# Patient Record
Sex: FEMALE | Race: WHITE | Employment: FULL TIME | URBAN - METROPOLITAN AREA
[De-identification: names, ages, dates, MRNs, and addresses within clinical notes are randomized per-mention and may not be internally consistent; named-entity substitution may affect disease eponyms.]

---

## 2017-08-08 PROBLEM — B96.89 ACUTE BACTERIAL PHARYNGITIS: Status: ACTIVE | Noted: 2017-08-08

## 2017-08-08 PROBLEM — M54.50 LOW BACK PAIN: Status: ACTIVE | Noted: 2017-08-08

## 2017-08-08 PROBLEM — Z78.0 MENOPAUSE: Status: ACTIVE | Noted: 2017-08-08

## 2017-08-08 PROBLEM — J02.8 ACUTE BACTERIAL PHARYNGITIS: Status: ACTIVE | Noted: 2017-08-08

## 2017-08-08 PROBLEM — L03.90 CELLULITIS: Status: ACTIVE | Noted: 2017-08-08

## 2017-08-08 PROBLEM — Z20.828 EXPOSURE TO INFLUENZA: Status: ACTIVE | Noted: 2017-08-08

## 2017-08-08 PROBLEM — E78.00 HYPERCHOLESTEROLEMIA: Status: ACTIVE | Noted: 2017-08-08

## 2017-08-09 PROBLEM — M26.609 TMJ DYSFUNCTION: Status: ACTIVE | Noted: 2017-08-09

## 2017-09-12 ENCOUNTER — OFFICE VISIT (OUTPATIENT)
Dept: INTERNAL MEDICINE CLINIC | Age: 52
End: 2017-09-12

## 2017-09-12 VITALS
BODY MASS INDEX: 28.85 KG/M2 | DIASTOLIC BLOOD PRESSURE: 70 MMHG | SYSTOLIC BLOOD PRESSURE: 120 MMHG | HEIGHT: 64 IN | WEIGHT: 169 LBS | HEART RATE: 70 BPM

## 2017-09-12 DIAGNOSIS — E78.00 HYPERCHOLESTEROLEMIA: ICD-10-CM

## 2017-09-12 DIAGNOSIS — Z00.00 ROUTINE PHYSICAL EXAMINATION: Primary | ICD-10-CM

## 2017-09-12 NOTE — PATIENT INSTRUCTIONS

## 2017-09-12 NOTE — MR AVS SNAPSHOT
Visit Information Date & Time Provider Department Dept. Phone Encounter #  
 9/12/2017  1:30 PM Bobbi Cervantes MD [address] [place] [address] [facility] [phone] [id number] Follow-up Instructions Return for as needed. Upcoming Health Maintenance Date Due Hepatitis C Screening 1965 DTaP/Tdap/Td series (1 - Tdap) 3/15/1984 PAP AKA CERVICAL CYTOLOGY 3/15/1984 BREAST CANCER SCRN MAMMOGRAM 3/15/2013 FOBT Q 1 YEAR AGE 50-75 3/15/2013 INFLUENZA AGE 9 TO ADULT 8/1/2017 Allergies as of 9/12/2017  Review Complete On: 9/12/2017 By: Bobbi Cervantes MD  
 No Known Allergies Current Immunizations  Never Reviewed No immunizations on file. Not reviewed this visit You Were Diagnosed With   
  
 Codes Comments Routine physical examination    -  Primary ICD-10-CM: Z00.00 ICD-9-CM: V70.0 Hypercholesterolemia     ICD-10-CM: E78.00 ICD-9-CM: 272.0 Vitals BP Pulse Height(growth percentile) Weight(growth percentile) BMI Smoking Status 120/70 (BP 1 Location: Right arm, BP Patient Position: Sitting) 70 5' 4\" (1.626 m) 169 lb (76.7 kg) 29.01 kg/m2 Never Smoker BMI and BSA Data Body Mass Index Body Surface Area  
 29.01 kg/m 2 1.86 m 2 Your Updated Medication List  
  
Notice  As of 9/12/2017  2:04 PM  
 You have not been prescribed any medications. We Performed the Following CBC WITH AUTOMATED DIFF [36842 CPT(R)] HEMOGLOBIN A1C WITH EAG [52562 CPT(R)] LIPID PANEL [46986 CPT(R)] METABOLIC PANEL, COMPREHENSIVE [08587 CPT(R)] TSH 3RD GENERATION [24743 CPT(R)] URINALYSIS W/ RFLX MICROSCOPIC [83105 CPT(R)] Follow-up Instructions Return for as needed. Patient Instructions High Cholesterol: Care Instructions Your Care Instructions Cholesterol is a type of fat in your blood.  It is needed for many body functions, such as making new cells. Cholesterol is made by your body. It also comes from food you eat. High cholesterol means that you have too much of the fat in your blood. This raises your risk of a heart attack and stroke. LDL and HDL are part of your total cholesterol. LDL is the \"bad\" cholesterol. High LDL can raise your risk for heart disease, heart attack, and stroke. HDL is the \"good\" cholesterol. It helps clear bad cholesterol from the body. High HDL is linked with a lower risk of heart disease, heart attack, and stroke. Your cholesterol levels help your doctor find out your risk for having a heart attack or stroke. You and your doctor can talk about whether you need to lower your risk and what treatment is best for you. A heart-healthy lifestyle along with medicines can help lower your cholesterol and your risk. The way you choose to lower your risk will depend on how high your risk is for heart attack and stroke. It will also depend on how you feel about taking medicines. Follow-up care is a key part of your treatment and safety. Be sure to make and go to all appointments, and call your doctor if you are having problems. It's also a good idea to know your test results and keep a list of the medicines you take. How can you care for yourself at home? · Eat a variety of foods every day. Good choices include fruits, vegetables, whole grains (like oatmeal), dried beans and peas, nuts and seeds, soy products (like tofu), and fat-free or low-fat dairy products. · Replace butter, margarine, and hydrogenated or partially hydrogenated oils with olive and canola oils. (Canola oil margarine without trans fat is fine.) · Replace red meat with fish, poultry, and soy protein (like tofu). · Limit processed and packaged foods like chips, crackers, and cookies. · Bake, broil, or steam foods. Don't restrepo them. · Be physically active.  Get at least 30 minutes of exercise on most days of the week. Walking is a good choice. You also may want to do other activities, such as running, swimming, cycling, or playing tennis or team sports. · Stay at a healthy weight or lose weight by making the changes in eating and physical activity listed above. Losing just a small amount of weight, even 5 to 10 pounds, can reduce your risk for having a heart attack or stroke. · Do not smoke. When should you call for help? Watch closely for changes in your health, and be sure to contact your doctor if: 
· You need help making lifestyle changes. · You have questions about your medicine. Where can you learn more? Go to http://shardaWorkanamelvin.info/. Enter G246 in the search box to learn more about \"High Cholesterol: Care Instructions. \" Current as of: April 3, 2017 Content Version: 11.3 © 2766-6104 [employer]. Care instructions adapted under license by [employer] (which disclaims liability or warranty for this information). If you have questions about a medical condition or this instruction, always ask your healthcare professional. [place] [address] any warranty or liability for your use of this information. Introducing [facility]! [employer] introduces [email or web address] patient portal. Now you can access parts of your medical record, email your doctor's office, and request medication refills online. 1. In your internet browser, go to https://[employer]. [employer] 2. Click on the First Time User? Click Here link in the Sign In box. You will see the New Member Sign Up page. 3. Enter your [email or web address] Access Code exactly as it appears below. You will not need to use this code after youve completed the sign-up process. If you do not sign up before the expiration date, you must request a new code. · [email or web address] Access Code: [email or web address] Expires: 12/11/2017  1:29 PM 
 
 4. Enter the last four digits of your Social Security Number (xxxx) and Date of Birth (mm/dd/yyyy) as indicated and click Submit. You will be taken to the next sign-up page. 5. Create a Streamezzo ID. This will be your [employer]ame[employer] login ID and cannot be changed, so think of one that is secure and easy to remember. 6. Create a [employer] password. You can change your password at any time. 7. Enter your Password Reset Question and Answer. This can be used at a later time if you forget your password. 8. Enter your e-mail address. You will receive e-mail notification when new information is available in [address] 9. Click Sign Up. You can now view and download portions of your medical record. 10. Click the Download Summary menu link to download a portable copy of your medical information. If you have questions, please visit the Frequently Asked Questions section of the [employer] website. Remember, [employer] is NOT to be used for urgent needs. For medical emergencies, dial 911. Now available from your iPhone and Android! Please provide this summary of care documentation to your next provider. If you have any questions after today's visit, please call [phone].

## 2017-09-12 NOTE — PROGRESS NOTES
Subjective: This note will not be viewable in [address]    45 y.o. female for annual Comprehensive personal healthcare examination. Mrs. Jennifer Conley is a 57-year-old  female who presents to the office today for complete checkup. The patient has been in excellent health and has no active medical problems and is on no current medications. She has had a mildly elevated LDL in the past but has had good HDL levels. She is a non-smoker and has no history of early coronary death in the family. The patient had a Pap test and mammogram done within the last 2 months and a colonoscopy done within the last 2-3 years. Her review of systems is otherwise negative is noted. History of present illness: This patient has multiple medical problems.   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          Past Medical History:   Diagnosis Date    Acute bacterial pharyngitis 8/8/2017    Cellulitis 8/8/2017    Exposure to influenza 8/8/2017    Hypercholesterolemia 8/8/2017    Low back pain 8/8/2017    Menopause 8/8/2017    TMJ dysfunction 8/9/2017     Past Surgical History:   Procedure Laterality Date    HX GYN      2 c sections 2002 & 2006     No Known Allergies    Social History     Social History    Marital status: UNKNOWN     Spouse name: N/A    Number of children: 2    Years of education: N/A     Social History Main Topics    Smoking status: Never Smoker    Smokeless tobacco: Never Used    Alcohol use 6.0 oz/week     10 Glasses of wine per week    Drug use: No    Sexual activity: Not Asked     Other Topics Concern    None     Social History Narrative     Family History   Problem Relation Age of Onset    Cancer Mother      breast and bladder     Heart Disease Father        Health Maintenance   Topic Date Due    Hepatitis C Screening  1965    DTaP/Tdap/Td series (1 - Tdap) 03/15/1984    PAP AKA CERVICAL CYTOLOGY  03/15/1984    BREAST CANCER SCRN MAMMOGRAM  03/15/2013    FOBT Q 1 YEAR AGE 50-75  03/15/2013    INFLUENZA AGE 9 TO ADULT  08/01/2017       Review of Systems  Constitutional:  She denies fever, weight loss, sweats or fatigue. HEENT:  No blurred or double vision, headache or dizziness. No difficulty with swallowing, taste, speech or smell. Respiratory:  No cough, wheezing or shortness of breath. No sputum production. Cardiac:  Denies chest pain, palpitations, unexplained indigestion, syncope, edema, PND or orthopnea. GI:  No changes in bowel movements, no abdominal pain, no bloating, anorexia, nausea, vomiting or heartburn. :  No frequency or dysuria. Denies incontinence. Extremities:  No joint pain, stiffness or swelling. Skin:  No recent rashes or mole changes. Neurological:  No numbness, tingling, burning paresthesias or loss of motor strength. No syncope, dizziness, frequent headaches or memory loss. ROS otherwise negative      Objective:     Vitals:    09/12/17 1338   BP: 120/70   Pulse: 70   Weight: 169 lb (76.7 kg)   Height: 5' 4\" (1.626 m)   PainSc:   0 - No pain     Body mass index is 29.01 kg/(m^2). Physical Examination:   General Appearance:  Well-developed, well-nourished, no acute distress. Vision:  Deferred to ophthalmologist.       HEENT:    Ears:  The TMs and ear canals were clear. Eyes:  The pupillary responses were normal.  Extraocular muscle function intact. Lids and conjunctiva not injected. No conjunctiva redness or drainage. Pharynx:  Clear with teeth in good repair. No masses were noted. Neck:  Supple without thyromegaly or adenopathy. No JVD noted. No carotid bruits. Lungs:  Clear to auscultation and percussion. Cardiac:  Regular rate and rhythm without murmur. PMI not displaced. No gallop, rub or click.   Abdomen: Flat, soft, non-tender without palpable organomegaly or mass. No pulsatile mass was felt, and no bruit was heard. Bowel sounds were active. Breasts:  Deferred to GYN  GYN: Deferred to GYN    Rectal exam: Deferred to GYN    Extremities:  No clubbing, cyanosis or edema. Pulses:  Dorsalis pedis and posterior tibial pulses felt without difficulty. Skin:  No rash or unusual mole changes noted. Lymph Nodes:  None felt in the cervical, supraclavicular, axillary or inguinal region. Neurological:  Cranial nerves II-XII grossly intact. Motor strength 5/5. DTRs 2+ and symmetric. Station and gait normal.  Physical exam otherwise negative        Assessment/Plan:     Diagnoses and all orders for this visit:    Routine physical examination  -     HEMOGLOBIN A1C WITH EAG  -     CBC WITH AUTOMATED DIFF  -     METABOLIC PANEL, COMPREHENSIVE  -     LIPID PANEL  -     URINALYSIS W/ RFLX MICROSCOPIC  -     TSH 3RD GENERATION    Hypercholesterolemia        Other instructions:     Overall the patient appears to be in excellent health. A prudent diet, exercise and weight loss are encouraged    An influenza vaccination is recommended this fall    Await results of multiple labs    Follow-up yearly    Follow-up Disposition:  Return for as needed.     Sue Lancaster MD

## 2017-09-12 NOTE — PROGRESS NOTES
Prema Guerra is a 45 y.o. female presenting for Complete Physical  .     1. Have you been to the ER, urgent care clinic since your last visit? Hospitalized since your last visit? No    2. Have you seen or consulted any other health care providers outside of the [address] [place] [address] since your last visit? Include any pap smears or colon screening. 6/2017 [facility] [place] pap smear    No flowsheet data found. No flowsheet data found. PHQ over the last two weeks 9/12/2017   Little interest or pleasure in doing things Not at all   Feeling down, depressed or hopeless Not at all   Total Score PHQ 2 0       There are no discontinued medications.

## 2017-09-13 LAB
ALBUMIN SERPL-MCNC: 4.7 G/DL (ref 3.5–5.5)
ALBUMIN/GLOB SERPL: 1.6 {RATIO} (ref 1.2–2.2)
ALP SERPL-CCNC: 88 IU/L (ref 39–117)
ALT SERPL-CCNC: 20 IU/L (ref 0–32)
APPEARANCE UR: CLEAR
AST SERPL-CCNC: 25 IU/L (ref 0–40)
BASOPHILS # BLD AUTO: 0 X10E3/UL (ref 0–0.2)
BASOPHILS NFR BLD AUTO: 0 %
BILIRUB SERPL-MCNC: <0.2 MG/DL (ref 0–1.2)
BILIRUB UR QL STRIP: NEGATIVE
BUN SERPL-MCNC: 22 MG/DL (ref 6–24)
BUN/CREAT SERPL: 28 (ref 9–23)
CALCIUM SERPL-MCNC: 9.6 MG/DL (ref 8.7–10.2)
CHLORIDE SERPL-SCNC: 99 MMOL/L (ref 96–106)
CHOLEST SERPL-MCNC: 285 MG/DL (ref 100–199)
CO2 SERPL-SCNC: 25 MMOL/L (ref 18–29)
COLOR UR: YELLOW
CREAT SERPL-MCNC: 0.8 MG/DL (ref 0.57–1)
EOSINOPHIL # BLD AUTO: 0.1 X10E3/UL (ref 0–0.4)
EOSINOPHIL NFR BLD AUTO: 1 %
ERYTHROCYTE [DISTWIDTH] IN BLOOD BY AUTOMATED COUNT: 13.7 % (ref 12.3–15.4)
EST. AVERAGE GLUCOSE BLD GHB EST-MCNC: 117 MG/DL
GLOBULIN SER CALC-MCNC: 2.9 G/DL (ref 1.5–4.5)
GLUCOSE SERPL-MCNC: 106 MG/DL (ref 65–99)
GLUCOSE UR QL: NEGATIVE
HBA1C MFR BLD: 5.7 % (ref 4.8–5.6)
HCT VFR BLD AUTO: 43.4 % (ref 34–46.6)
HDLC SERPL-MCNC: 57 MG/DL
HGB BLD-MCNC: 14.8 G/DL (ref 11.1–15.9)
HGB UR QL STRIP: NEGATIVE
IMM GRANULOCYTES # BLD: 0.1 X10E3/UL (ref 0–0.1)
IMM GRANULOCYTES NFR BLD: 1 %
KETONES UR QL STRIP: NEGATIVE
LDLC SERPL CALC-MCNC: ABNORMAL MG/DL (ref 0–99)
LEUKOCYTE ESTERASE UR QL STRIP: NEGATIVE
LYMPHOCYTES # BLD AUTO: 2.9 X10E3/UL (ref 0.7–3.1)
LYMPHOCYTES NFR BLD AUTO: 39 %
MCH RBC QN AUTO: 31.3 PG (ref 26.6–33)
MCHC RBC AUTO-ENTMCNC: 34.1 G/DL (ref 31.5–35.7)
MCV RBC AUTO: 92 FL (ref 79–97)
MICRO URNS: NORMAL
MONOCYTES # BLD AUTO: 0.6 X10E3/UL (ref 0.1–0.9)
MONOCYTES NFR BLD AUTO: 8 %
NEUTROPHILS # BLD AUTO: 3.8 X10E3/UL (ref 1.4–7)
NEUTROPHILS NFR BLD AUTO: 51 %
NITRITE UR QL STRIP: NEGATIVE
PH UR STRIP: 6 [PH] (ref 5–7.5)
PLATELET # BLD AUTO: 279 X10E3/UL (ref 150–379)
POTASSIUM SERPL-SCNC: 4 MMOL/L (ref 3.5–5.2)
PROT SERPL-MCNC: 7.6 G/DL (ref 6–8.5)
PROT UR QL STRIP: NEGATIVE
RBC # BLD AUTO: 4.73 X10E6/UL (ref 3.77–5.28)
SODIUM SERPL-SCNC: 139 MMOL/L (ref 134–144)
SP GR UR: 1.01 (ref 1–1.03)
TRIGL SERPL-MCNC: 429 MG/DL (ref 0–149)
TSH SERPL DL<=0.005 MIU/L-ACNC: 1.66 UIU/ML (ref 0.45–4.5)
UROBILINOGEN UR STRIP-MCNC: 0.2 MG/DL (ref 0.2–1)
VLDLC SERPL CALC-MCNC: ABNORMAL MG/DL (ref 5–40)
WBC # BLD AUTO: 7.5 X10E3/UL (ref 3.4–10.8)

## 2017-10-13 ENCOUNTER — APPOINTMENT (OUTPATIENT)
Dept: INTERNAL MEDICINE CLINIC | Age: 52
End: 2017-10-13

## 2017-10-13 DIAGNOSIS — E78.00 HYPERCHOLESTEROLEMIA: ICD-10-CM

## 2017-10-14 LAB
CHOLEST SERPL-MCNC: 257 MG/DL (ref 100–199)
HDLC SERPL-MCNC: 59 MG/DL
LDLC SERPL CALC-MCNC: 147 MG/DL (ref 0–99)
TRIGL SERPL-MCNC: 254 MG/DL (ref 0–149)
VLDLC SERPL CALC-MCNC: 51 MG/DL (ref 5–40)

## 2017-10-14 NOTE — PROGRESS NOTES
Overall cholesterol and TG's better but still elevated.  Prefer to treat with diet, exercise, weight loss for now

## 2019-08-13 ENCOUNTER — TELEPHONE (OUTPATIENT)
Dept: INTERNAL MEDICINE CLINIC | Age: 54
End: 2019-08-13

## 2019-08-13 NOTE — TELEPHONE ENCOUNTER
We can see her tomorrow at 9:30 AM.  Please remind her that she missed her appointment at 8:10 AM yesterday

## 2019-08-13 NOTE — TELEPHONE ENCOUNTER
Patient calling to see if she can get an appt this week with Dr. Keo Correa. She went to [facility] last Thursday for abdominal pain. They did a CT scan and blood work which she has. They also told her to follow up with her PCP this week. Please advise.

## 2019-08-14 ENCOUNTER — OFFICE VISIT (OUTPATIENT)
Dept: INTERNAL MEDICINE CLINIC | Age: 54
End: 2019-08-14

## 2019-08-14 VITALS
BODY MASS INDEX: 29.64 KG/M2 | OXYGEN SATURATION: 98 % | SYSTOLIC BLOOD PRESSURE: 118 MMHG | TEMPERATURE: 98.1 F | RESPIRATION RATE: 14 BRPM | HEART RATE: 70 BPM | HEIGHT: 64 IN | WEIGHT: 173.6 LBS | DIASTOLIC BLOOD PRESSURE: 70 MMHG

## 2019-08-14 DIAGNOSIS — K65.4 MESENTERIC PANNICULITIS (HCC): Primary | ICD-10-CM

## 2019-08-14 NOTE — PROGRESS NOTES
This note will not be viewable in [address] Subjective:     Sharad Del Real presents to the office today and transition of care subsequent to an emergency room visit at the [facility] emergency room on August 8th when she presented with abdominal pain. Her lab work was generally unremarkable except for some microscopic hematuria. 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 Patient noted diffuse abdominal discomfort with some of the discomfort more localized to the right lower quadrant. She does have a prior history of an appendectomy. She had not been running any fevers and it had no chills. She had no urinary symptoms. The patient was given naproxen to take which she is only done a couple of times since then. She has had no recent exacerbation of symptoms. The patient denies any joint pains or rashes or other rheumatological symptoms. Past Medical History:   Diagnosis Date    Acute bacterial pharyngitis 8/8/2017    Cellulitis 8/8/2017    Exposure to influenza 8/8/2017    Hypercholesterolemia 8/8/2017    Low back pain 8/8/2017    Menopause 8/8/2017    TMJ dysfunction 8/9/2017     Past Surgical History:   Procedure Laterality Date    HX GYN      2 c sections 2002 & 2006     No Known Allergies    Social History     Socioeconomic History    Marital status:      Spouse name: Not on file    Number of children: 2    Years of education: Not on file    Highest education level: Not on file   Tobacco Use    Smoking status: Never Smoker    Smokeless tobacco: Never Used   Substance and Sexual Activity    Alcohol use:  Yes     Alcohol/week: 10.0 standard drinks     Types: 10 Glasses of wine per week    Drug use: No     Family History   Problem Relation Age of Onset    Cancer Mother         breast and bladder     Heart Disease Father        Review of Systems:  GEN: no weight loss, weight gain, fatigue or night sweats  CV: no PND, orthopnea, or palpitations  Resp: no dyspnea on exertion, no cough  Abd: no nausea, vomiting or diarrhea  EXT: denies edema, claudication  Endocrine: no hair loss, excessive thirst or polyuria  Neurological ROS: no TIA or stroke symptoms  ROS otherwise negative      Objective:     Visit Vitals  /70 (BP 1 Location: Left arm, BP Patient Position: Sitting)   Pulse 70   Temp 98.1 °F (36.7 °C) (Oral)   Resp 14   Ht 5' 4\" (1.626 m)   Wt 173 lb 9.6 oz (78.7 kg)   SpO2 98%   BMI 29.80 kg/m²     Body mass index is 29.8 kg/m². General:   alert, cooperative and no distress   Eyes: conjunctivae/sclerae clear. PERRL, EOM's intact   Mouth:  No oral lesions, no pharyngeal erythema, no exudates   Neck: Trachea midline, no thyromegaly, no bruits   Heart: S1 and S2 normal,no murmurs noted    Lungs: Clear to auscultation bilaterally, no increased work of breathing   Abdomen: Soft, nontender. Normal bowel sounds   Extremities: No edema or cyanosis   Neuro: ..alert, oriented x3,speech normal in context and clarity, cranial nerves II-XII intact,motor strength: full proximally and distally,gait: normal  reflexes: full and symmetric     Physical exam otherwise negative         Assessment/Plan:     Diagnoses and all orders for this visit:    Mesenteric panniculitis ([address]  -     SED RATE (ESR)  -     C REACTIVE PROTEIN, QT  -     ARSENIO W/REFLEX  -     URINALYSIS W/ RFLX MICROSCOPIC  -     REFERRAL TO GASTROENTEROLOGY        Other instructions:   Patient's medications were reviewed and reconciled. Records received from the [facility] emergency room dated 8/8 were reviewed today. I have encouraged her to take her naproxen twice daily. We will obtain a sed rate, C-reactive protein, ARSENIO with reflex and also repeat her urinalysis. Will refer to GI for further management. Follow-up here as previously scheduled    Follow-up and Dispositions    · Return for As previously scheduled.          Jessica Farnsworth MD

## 2019-08-14 NOTE — PROGRESS NOTES
Sushila Winston is a 62 y.o. female presenting for Abdominal Pain (stopped 8-11-19)  . 1. Have you been to the ER, urgent care clinic since your last visit? Hospitalized since your last visit? Yes When: 8-8-19 Where: [facility] ER Reason for visit: abd pain. 2. Have you seen or consulted any other health care providers outside of the [address] [place] [address] since your last visit? Include any pap smears or colon screening. GYN 8-9-19    No flowsheet data found. No flowsheet data found. 3 most recent PHQ Screens 8/14/2019   Little interest or pleasure in doing things Not at all   Feeling down, depressed, irritable, or hopeless Not at all   Total Score PHQ 2 0       There are no discontinued medications.

## 2019-08-15 LAB
ANA SER QL: NEGATIVE
APPEARANCE UR: CLEAR
BILIRUB UR QL STRIP: NEGATIVE
COLOR UR: YELLOW
CRP SERPL-MCNC: 7 MG/L (ref 0–10)
ERYTHROCYTE [SEDIMENTATION RATE] IN BLOOD BY WESTERGREN METHOD: 6 MM/HR (ref 0–40)
GLUCOSE UR QL: NEGATIVE
HGB UR QL STRIP: NEGATIVE
KETONES UR QL STRIP: NEGATIVE
LEUKOCYTE ESTERASE UR QL STRIP: NEGATIVE
MICRO URNS: NORMAL
NITRITE UR QL STRIP: NEGATIVE
PH UR STRIP: 6.5 [PH] (ref 5–7.5)
PROT UR QL STRIP: NEGATIVE
SP GR UR: 1.01 (ref 1–1.03)
UROBILINOGEN UR STRIP-MCNC: 0.2 MG/DL (ref 0.2–1)

## 2019-08-15 RX ORDER — NAPROXEN 500 MG/1
500 TABLET ORAL 2 TIMES DAILY WITH MEALS
Qty: 30 TAB | Refills: 0 | Status: SHIPPED | OUTPATIENT
Start: 2019-08-15 | End: 2020-10-09 | Stop reason: ALTCHOICE

## 2019-08-15 NOTE — TELEPHONE ENCOUNTER
Patient was instructed to F/U with you.  Stated she also have another question about an appointment the was set up with a specialist    Call Back [phone]

## 2019-08-15 NOTE — TELEPHONE ENCOUNTER
Patient states that she can not get in to see the specialist until September 18 and it would be a PA and she is un easy about this what is your opinion. Also needs refills of her Naproxin if you want her to continue this            Requested Prescriptions     Pending Prescriptions Disp Refills    naproxen (NAPROSYN) 500 mg tablet 30 Tab 0     Sig: Take 1 Tab by mouth two (2) times daily (with meals).

## 2019-10-16 ENCOUNTER — HOSPITAL ENCOUNTER (OUTPATIENT)
Dept: CT IMAGING | Age: 54
Discharge: HOME | End: 2019-10-16
Attending: PHYSICIAN ASSISTANT
Payer: COMMERCIAL

## 2019-10-16 DIAGNOSIS — R93.89: ICD-10-CM

## 2019-10-16 DIAGNOSIS — K76.0 STEATOSIS OF LIVER: ICD-10-CM

## 2019-10-16 DIAGNOSIS — R10.31 RLQ ABDOMINAL PAIN: ICD-10-CM

## 2019-10-16 PROCEDURE — 74011636320 HC RX REV CODE- 636/320: Performed by: PHYSICIAN ASSISTANT

## 2019-10-16 PROCEDURE — 74177 CT ABD & PELVIS W/CONTRAST: CPT

## 2019-10-16 RX ORDER — SODIUM CHLORIDE 0.9 % (FLUSH) 0.9 %
10 SYRINGE (ML) INJECTION
Status: COMPLETED | OUTPATIENT
Start: 2019-10-16 | End: 2019-10-16

## 2019-10-16 RX ADMIN — Medication 10 ML: at 09:40

## 2019-10-16 RX ADMIN — IOPAMIDOL 100 ML: 755 INJECTION, SOLUTION INTRAVENOUS at 09:40

## 2019-10-16 RX ADMIN — IOHEXOL 20 ML: 240 INJECTION, SOLUTION INTRATHECAL; INTRAVASCULAR; INTRAVENOUS; ORAL at 09:40

## 2020-05-28 ENCOUNTER — TELEPHONE (OUTPATIENT)
Dept: INTERNAL MEDICINE CLINIC | Age: 55
End: 2020-05-28

## 2020-05-28 NOTE — TELEPHONE ENCOUNTER
Patient states she has ear pain in both of her ears.      States it stated a couple days ago     [phone]

## 2020-05-28 NOTE — TELEPHONE ENCOUNTER
Spoke to patient. She said she will give it more time and call us next week if she feels she needs to be seen.

## 2020-10-09 ENCOUNTER — OFFICE VISIT (OUTPATIENT)
Dept: INTERNAL MEDICINE CLINIC | Age: 55
End: 2020-10-09
Payer: COMMERCIAL

## 2020-10-09 VITALS
HEART RATE: 92 BPM | SYSTOLIC BLOOD PRESSURE: 130 MMHG | RESPIRATION RATE: 20 BRPM | DIASTOLIC BLOOD PRESSURE: 82 MMHG | OXYGEN SATURATION: 98 % | TEMPERATURE: 98.9 F | BODY MASS INDEX: 30.8 KG/M2 | HEIGHT: 64 IN | WEIGHT: 180.4 LBS

## 2020-10-09 DIAGNOSIS — M77.01 MEDIAL EPICONDYLITIS OF RIGHT ELBOW: ICD-10-CM

## 2020-10-09 DIAGNOSIS — Z23 NEEDS FLU SHOT: Primary | ICD-10-CM

## 2020-10-09 PROCEDURE — 90471 IMMUNIZATION ADMIN: CPT | Performed by: INTERNAL MEDICINE

## 2020-10-09 PROCEDURE — 99213 OFFICE O/P EST LOW 20 MIN: CPT | Performed by: INTERNAL MEDICINE

## 2020-10-09 PROCEDURE — 90686 IIV4 VACC NO PRSV 0.5 ML IM: CPT | Performed by: INTERNAL MEDICINE

## 2020-10-09 RX ORDER — DICLOFENAC SODIUM 75 MG/1
75 TABLET, DELAYED RELEASE ORAL 2 TIMES DAILY
Qty: 60 TAB | Refills: 0 | Status: SHIPPED | OUTPATIENT
Start: 2020-10-09 | End: 2021-04-28 | Stop reason: ALTCHOICE

## 2020-10-09 NOTE — PROGRESS NOTES
Taras Howell is a 60 y.o. female presenting for Elbow Injury (R)  . 1. Have you been to the ER, urgent care clinic since your last visit? Hospitalized since your last visit? No    2. Have you seen or consulted any other health care providers outside of the [address] [place] [address] since your last visit? Include any pap smears or colon screening. No    No flowsheet data found. No flowsheet data found. 3 most recent PHQ Screens 8/14/2019   Little interest or pleasure in doing things Not at all   Feeling down, depressed, irritable, or hopeless Not at all   Total Score PHQ 2 0       There are no discontinued medications. After obtaining written consent and per orders of Dr. Toyin Zhong, injection of Flulaval given by Leyda Sanchez CMA. Order and injection/medication verified by Dr La Tate. Patient tolerated procedure well. VIS was given to them. No reactions noted.

## 2020-10-09 NOTE — PATIENT INSTRUCTIONS
Vaccine Information Statement Influenza (Flu) Vaccine (Inactivated or Recombinant): What You Need to Know Many Vaccine Information Statements are available in [place] and other languages. See www.immunize.org/vis Hojas de información sobre vacunas están disponibles en español y en muchos otros idiomas. Visite www.immunize.org/vis 1. Why get vaccinated? Influenza vaccine can prevent influenza (flu). Flu is a contagious disease that spreads around the [facility] every year, usually between October and May. Anyone can get the flu, but it is more dangerous for some people. Infants and young children, people 72years of age and older, pregnant women, and people with certain health conditions or a weakened immune system are at greatest risk of flu complications. Pneumonia, bronchitis, sinus infections and ear infections are examples of flu-related complications. If you have a medical condition, such as heart disease, cancer or diabetes, flu can make it worse. Flu can cause fever and chills, sore throat, muscle aches, fatigue, cough, headache, and runny or stuffy nose. Some people may have vomiting and diarrhea, though this is more common in children than adults. Each year thousands of people in the [facility] die from flu, and many more are hospitalized. Flu vaccine prevents millions of illnesses and flu-related visits to the doctor each year. 2. Influenza vaccines CDC recommends everyone 10months of age and older get vaccinated every flu season. Children 6 months through 6years of age may need 2 doses during a single flu season. Everyone else needs only 1 dose each flu season. It takes about 2 weeks for protection to develop after vaccination. There are many flu viruses, and they are always changing. Each year a new flu vaccine is made to protect against three or four viruses that are likely to cause disease in the upcoming flu season.  Even when the vaccine doesnt exactly match these viruses, it may still provide some protection. Influenza vaccine does not cause flu. Influenza vaccine may be given at the same time as other vaccines. 3. Talk with your health care provider Tell your vaccine provider if the person getting the vaccine: 
 Has had an allergic reaction after a previous dose of influenza vaccine, or has any severe, life-threatening allergies.  Has ever had Guillain-Barré Syndrome (also called GBS). In some cases, your health care provider may decide to postpone influenza vaccination to a future visit. People with minor illnesses, such as a cold, may be vaccinated. People who are moderately or severely ill should usually wait until they recover before getting influenza vaccine. Your health care provider can give you more information. 4. Risks of a reaction  Soreness, redness, and swelling where shot is given, fever, muscle aches, and headache can happen after influenza vaccine.  There may be a very small increased risk of Guillain-Barré Syndrome (GBS) after inactivated influenza vaccine (the flu shot). Julien Rosario children who get the flu shot along with pneumococcal vaccine (PCV13), and/or DTaP vaccine at the same time might be slightly more likely to have a seizure caused by fever. Tell your health care provider if a child who is getting flu vaccine has ever had a seizure. People sometimes faint after medical procedures, including vaccination. Tell your provider if you feel dizzy or have vision changes or ringing in the ears. As with any medicine, there is a very remote chance of a vaccine causing a severe allergic reaction, other serious injury, or death. 5. What if there is a serious problem? An allergic reaction could occur after the vaccinated person leaves the clinic.  If you see signs of a severe allergic reaction (hives, swelling of the face and throat, difficulty breathing, a fast heartbeat, dizziness, or weakness), call 9-1-1 and get the person to the nearest hospital. 
 
For other signs that concern you, call your health care provider. Adverse reactions should be reported to the Vaccine Adverse Event Reporting System (VAERS). Your health care provider will usually file this report, or you can do it yourself. Visit the VAERS website at www.vaers. hhs.gov or call 0-428.601.1436. VAERS is only for reporting reactions, and VAERS staff do not give medical advice. 6. The National Vaccine Injury Compensation Program 
 
The [facility] Vaccine Injury Compensation Program (VICP) is a federal program that was created to compensate people who may have been injured by certain vaccines. Visit the VICP website at www.[facility].gov/vaccinecompensation or call 0-825.615.6479 to learn about the program and about filing a claim. There is a time limit to file a claim for compensation. 7. How can I learn more?  Ask your health care provider.  Call your local or state health department.  Contact the Centers for Disease Control and Prevention (CDC): 
- Call 6-578.508.5863 (2-646-TXU-INFO) or 
- Visit CDCs influenza website at www.cdc.gov/flu Vaccine Information Statement (Interim) Inactivated Influenza Vaccine 8/15/2019 
[address] [id number] [facility] and [email or web address] Centers for Disease Control and Prevention Office Use Only Golfer's Elbow: Care Instructions Your Care Instructions The pain and soreness in the inner part of your elbow is caused by a problem called golfer's elbow. Bending the wrist over and over again has hurt the tendons that attach to your inner elbow. The muscles in your forearm also may hurt. Golfer's elbow usually gets better with treatment at home. Follow-up care is a key part of your treatment and safety. Be sure to make and go to all appointments, and call your doctor if you are having problems.  It's also a good idea to know your test results and keep a list of the medicines you take. How can you care for yourself at home? · Rest your elbow and wrist. Try to avoid movements that are painful. You may have to do this for weeks to months. Follow your doctor's directions for how long to rest. 
· Put ice or a cold pack on your elbow for 10 to 20 minutes at a time. Try to do this every 1 to 2 hours for the next 3 days (when you are awake) or until the swelling goes down. Put a thin cloth between the ice and your skin. · Prop up the sore arm on a pillow when you ice it or anytime you sit or lie down during the next 3 days. Try to keep it above the level of your heart. This will help reduce swelling. · Take pain medicine exactly as directed. ? If the doctor gave you a prescription medicine for pain, take it as prescribed. ? If you are not taking a prescription pain medicine, ask your doctor if you can take an over-the-counter medicine. · If your doctor gave you a brace or splint, use it as directed. A \"counterforce\" brace is a strap around the forearm, just below the elbow. It may ease the pressure on the tendon and may spread force throughout the arm. · Follow your doctor's or physical therapist's directions for exercise. To prevent golfer's elbow · After your elbow has healed, learn the best techniques for your work or sport. A physical or occupational therapist can help you. When should you call for help? Call your doctor now or seek immediate medical care if: 
  · Your pain gets worse.  
  · You cannot bend your elbow normally.  
  · You have tingling, weakness, or numbness in your hand and fingers.  
  · Your arm or hand is cool or pale or changes color. Watch closely for changes in your health, and be sure to contact your doctor if: 
  · You have work problems caused by your elbow pain.  
  · Your pain is not better after 2 weeks. Where can you learn more? Go to http://www.CineC[employer]/ Enter C372 in the search box to learn more about \"Golfer's Elbow: Care Instructions. \" Current as of: March 2, 2020               Content Version: 12.6 © 4647-1391 [employer]. Care instructions adapted under license by [employer] (which disclaims liability or warranty for this information). If you have questions about a medical condition or this instruction, always ask your healthcare professional. [address] any warranty or liability for your use of this information. Golfer's Elbow: Exercises Introduction Here are some examples of exercises for you to try. The exercises may be suggested for a condition or for rehabilitation. Start each exercise slowly. Ease off the exercises if you start to have pain. You will be told when to start these exercises and which ones will work best for you. How to do the exercises Wrist extensor stretch 1. Extend your affected arm in front of you and make a fist with your palm facing down. 2. Bend your wrist so that your fist points toward the floor. 3. With your other hand, gently bend your wrist farther until you feel a mild to moderate stretch in your forearm. 4. Hold for at least 15 to 30 seconds. 5. Repeat 2 to 4 times. 6. Repeat steps 1 through 5 with your fingers pointing toward the floor. Forearm extensor stretch 1. Place your affected elbow down at your side, bent at about 90 degrees. Then make a fist with your palm facing down. 2. Keeping your wrist bent, slowly straighten your elbow so your arm is down at your side. Then twist your fist out so your palm is facing out to the side and you feel a stretch. 3. Hold for at least 15 to 30 seconds. 4. Repeat 2 to 4 times. Wrist flexor stretch 1. Extend your affected arm in front of you with your palm facing away from your body. 2. Bend back your wrist, pointing your hand up toward the ceiling. 3. With your other hand, gently bend your wrist farther until you feel a mild to moderate stretch in your forearm. 4. Hold for at least 15 to 30 seconds. 5. Repeat 2 to 4 times. 6. Repeat steps 1 through 5, but this time extend your affected arm in front of you with your palm facing up. Then bend back your wrist, pointing your hand toward the floor. Wrist curls 1. Place your forearm on a table with your hand hanging over the edge of the table, palm up. 2. Place a 1- to 2-pound weight in your hand. This may be a dumbbell, a can of food, or a filled water bottle. 3. Slowly raise and lower the weight while keeping your forearm on the table and your palm facing up. 4. Repeat this motion 8 to 12 times. 5. Switch arms, and do steps 1 through 4. 
6. Repeat with your hand facing down toward the floor. Switch arms. Resisted wrist extension 1. Sit leaning forward with your legs slightly spread. Then place your affected forearm on your thigh with your hand and wrist in front of your knee. 2. Grasp one end of an exercise band with your palm down, and step on the other end. 
3. Slowly bend your wrist upward for a count of 2, then lower your wrist slowly to a count of 5. 
4. Repeat 8 to 12 times. Resisted wrist flexion 1. Sit leaning forward with your legs slightly spread. Then place your affected forearm on your thigh with your hand and wrist in front of your knee. 2. Grasp one end of an exercise band with your palm up, and step on the other end. 
3. Slowly bend your wrist upward for a count of 2, then lower your wrist slowly to a count of 5. 
4. Repeat 8 to 12 times. Neck stretch to the side 1. This stretch works best if you keep your shoulder down as you lean away from it. To help you remember to do this, start by relaxing your shoulders and lightly holding on to your thighs or your chair.  
2. Tilt your head away from your affected elbow and toward your opposite shoulder. For example, if your right elbow is sore, keep your right shoulder down as you lean your head toward your left shoulder. 3. Hold for 15 to 30 seconds. Let the weight of your head stretch your muscles. 4. If you would like a little added stretch, use your hand to gently and steadily pull your head toward your shoulder. For example, if your right elbow is sore, use your left hand to gently pull your head toward your left shoulder. 5. Repeat 2 to 4 times. Resisted forearm pronation 1. Sit leaning forward with your legs slightly spread. Then place your affected forearm on your thigh with your hand and wrist in front of your knee. 2. Grasp one end of an exercise band with your palm up, and step on the other end. 3. Keeping your wrist straight, roll your palm inward toward your thigh for a count of 2, then slowly move your wrist back to the starting position to a count of 5. 
4. Repeat 8 to 12 times. Resisted supination 1. Sit leaning forward with your legs slightly spread. Then place your affected forearm on your thigh with your hand and wrist in front of your knee. 2. Grasp one end of an exercise band with your palm down, and step on the other end. 3. Keeping your wrist straight, roll your palm outward and away from your thigh for a count of 2, then slowly move your wrist back to the starting position to a count of 5. 
4. Repeat 8 to 12 times. Follow-up care is a key part of your treatment and safety. Be sure to make and go to all appointments, and call your doctor if you are having problems. It's also a good idea to know your test results and keep a list of the medicines you take. Where can you learn more? Go to http://www.gray.com/ Enter ([phone] [phone] in the search box to learn more about \"Golfer's Elbow: Exercises. \" Current as of: March 2, 2020               Content Version: 12.6 © 4815-3021 [employer]. Care instructions adapted under license by [employer] (which disclaims liability or warranty for this information). If you have questions about a medical condition or this instruction, always ask your healthcare professional. [address] any warranty or liability for your use of this information.

## 2020-10-09 NOTE — PROGRESS NOTES
This note will not be viewable in [address] Subjective:     Yobani Reilly presents to the office today with complaints of right elbow pain. Patient states that she fell and injured it approximately 3 weeks ago. She notes pain along the medial aspect of the elbow. She had recently been doing a fair amount of lifting as they were moving her mother out of her home. She notes that the pain does not radiate in a radicular fashion. It is along the medial elbow but there is been no swelling or redness. She has not lost any range of motion. Past Medical History:   Diagnosis Date    Acute bacterial pharyngitis 8/8/2017    Cellulitis 8/8/2017    Exposure to influenza 8/8/2017    Hypercholesterolemia 8/8/2017    Low back pain 8/8/2017    Menopause 8/8/2017    TMJ dysfunction 8/9/2017     Past Surgical History:   Procedure Laterality Date    HX GYN      2 c sections 2002 & 2006     No Known Allergies  Current Outpatient Medications   Medication Sig Dispense Refill    diclofenac EC (VOLTAREN) 75 mg EC tablet Take 1 Tab by mouth two (2) times a day. 61 Tab 0     Social History     Socioeconomic History    Marital status:      Spouse name: Not on file    Number of children: 2    Years of education: Not on file    Highest education level: Not on file   Tobacco Use    Smoking status: Never Smoker    Smokeless tobacco: Never Used   Substance and Sexual Activity    Alcohol use:  Yes     Alcohol/week: 10.0 standard drinks     Types: 10 Glasses of wine per week    Drug use: No     Family History   Problem Relation Age of Onset    Cancer Mother         breast and bladder     Heart Disease Father        Review of Systems:  GEN: no weight loss, weight gain, fatigue or night sweats  EXT: Positive for right elbow pain  Neurological ROS: no TIA or stroke symptoms  ROS otherwise negative      Objective:     Visit Vitals  /82 (BP 1 Location: Left arm, BP Patient Position: Sitting)   Pulse 92   Temp 98.9 °F (37.2 °C) (Oral)   Resp 20   Ht 5' 4\" (1.626 m)   Wt 180 lb 6.4 oz (81.8 kg)   SpO2 98%   BMI 30.97 kg/m²     Body mass index is 30.97 kg/m². General:   alert, cooperative and no distress   Extremities:  Flexion, extension and rotational movement of the right elbow completely normal.  No obvious swelling or ecchymosis present. No pain over the lateral epicondylar region. Positive pain elicited with palpation over medial epicondylar area. Wrist range of motion normal and radial pulse intact   Neuro: ..alert, oriented x3,speech normal in context and clarity, cranial nerves II-XII intact,motor strength: full proximally and distally,gait: normal  reflexes: full and symmetric     Physical exam otherwise negative         Assessment/Plan:     Diagnoses and all orders for this visit:    Needs flu shot  -     INFLUENZA VIRUS VAC QUAD,SPLIT,PRESV FREE SYRINGE IM    Medial epicondylitis of right elbow  -     diclofenac EC (VOLTAREN) 75 mg EC tablet; Take 1 Tab by mouth two (2) times a day., Normal, Disp-60 Tab,R-0        Other instructions: The patient's medications were reviewed and reconciled. Start diclofenac fourth 2 to 3 weeks    Limit lifting and proper lifting techniques discussed. Exercise sheet given    Ice massage    Consider orthopedic evaluation should she fail conservative management    Follow-up and Dispositions    · Return if symptoms worsen or fail to improve. Willie Michelle MD    Please note that this dictation was completed with [employer], the computer voice recognition software. Quite often unanticipated grammatical, syntax, homophones, and other interpretive errors are inadvertently transcribed by the computer software. Please disregard these errors. Please excuse any errors that have escaped final proofreading.

## 2020-10-21 ENCOUNTER — OFFICE VISIT (OUTPATIENT)
Dept: INTERNAL MEDICINE CLINIC | Age: 55
End: 2020-10-21
Payer: COMMERCIAL

## 2020-10-21 VITALS
BODY MASS INDEX: 31 KG/M2 | DIASTOLIC BLOOD PRESSURE: 78 MMHG | HEIGHT: 64 IN | OXYGEN SATURATION: 97 % | TEMPERATURE: 98 F | WEIGHT: 181.6 LBS | SYSTOLIC BLOOD PRESSURE: 128 MMHG | HEART RATE: 79 BPM | RESPIRATION RATE: 20 BRPM

## 2020-10-21 DIAGNOSIS — K65.4 MESENTERIC PANNICULITIS (HCC): ICD-10-CM

## 2020-10-21 DIAGNOSIS — M77.01 MEDIAL EPICONDYLITIS OF RIGHT ELBOW: ICD-10-CM

## 2020-10-21 DIAGNOSIS — Z11.59 NEED FOR HEPATITIS C SCREENING TEST: ICD-10-CM

## 2020-10-21 DIAGNOSIS — E78.00 HYPERCHOLESTEROLEMIA: ICD-10-CM

## 2020-10-21 DIAGNOSIS — F32.A DEPRESSION, UNSPECIFIED DEPRESSION TYPE: ICD-10-CM

## 2020-10-21 DIAGNOSIS — Z00.00 ROUTINE PHYSICAL EXAMINATION: Primary | ICD-10-CM

## 2020-10-21 PROBLEM — M54.50 LOW BACK PAIN: Status: RESOLVED | Noted: 2017-08-08 | Resolved: 2020-10-21

## 2020-10-21 PROBLEM — L03.90 CELLULITIS: Status: RESOLVED | Noted: 2017-08-08 | Resolved: 2020-10-21

## 2020-10-21 PROBLEM — Z20.828 EXPOSURE TO INFLUENZA: Status: RESOLVED | Noted: 2017-08-08 | Resolved: 2020-10-21

## 2020-10-21 PROBLEM — J02.8 ACUTE BACTERIAL PHARYNGITIS: Status: RESOLVED | Noted: 2017-08-08 | Resolved: 2020-10-21

## 2020-10-21 PROBLEM — Z78.0 MENOPAUSE: Status: RESOLVED | Noted: 2017-08-08 | Resolved: 2020-10-21

## 2020-10-21 PROBLEM — B96.89 ACUTE BACTERIAL PHARYNGITIS: Status: RESOLVED | Noted: 2017-08-08 | Resolved: 2020-10-21

## 2020-10-21 PROCEDURE — 99396 PREV VISIT EST AGE 40-64: CPT | Performed by: INTERNAL MEDICINE

## 2020-10-21 PROCEDURE — 36415 COLL VENOUS BLD VENIPUNCTURE: CPT | Performed by: INTERNAL MEDICINE

## 2020-10-21 NOTE — PROGRESS NOTES
Daryl Espinoza is a 60 y.o. female presenting for Complete Physical  .     1. Have you been to the ER, urgent care clinic since your last visit? Hospitalized since your last visit? No    2. Have you seen or consulted any other health care providers outside of the [address] [place] [address] since your last visit? Include any pap smears or colon screening. No    No flowsheet data found. No flowsheet data found. 3 most recent PHQ Screens 10/21/2020   Little interest or pleasure in doing things Nearly every day   Feeling down, depressed, irritable, or hopeless Nearly every day   Total Score PHQ 2 6       There are no discontinued medications.

## 2020-10-21 NOTE — PATIENT INSTRUCTIONS
DASH Diet: Care Instructions Your Care Instructions The DASH diet is an eating plan that can help lower your blood pressure. DASH stands for Dietary Approaches to Stop Hypertension. Hypertension is high blood pressure. The DASH diet focuses on eating foods that are high in calcium, potassium, and magnesium. These nutrients can lower blood pressure. The foods that are highest in these nutrients are fruits, vegetables, low-fat dairy products, nuts, seeds, and legumes. But taking calcium, potassium, and magnesium supplements instead of eating foods that are high in those nutrients does not have the same effect. The DASH diet also includes whole grains, fish, and poultry. The DASH diet is one of several lifestyle changes your doctor may recommend to lower your high blood pressure. Your doctor may also want you to decrease the amount of sodium in your diet. Lowering sodium while following the DASH diet can lower blood pressure even further than just the DASH diet alone. Follow-up care is a key part of your treatment and safety. Be sure to make and go to all appointments, and call your doctor if you are having problems. It's also a good idea to know your test results and keep a list of the medicines you take. How can you care for yourself at home? Following the DASH diet · Eat 4 to 5 servings of fruit each day. A serving is 1 medium-sized piece of fruit, ½ cup chopped or canned fruit, 1/4 cup dried fruit, or 4 ounces (½ cup) of fruit juice. Choose fruit more often than fruit juice. · Eat 4 to 5 servings of vegetables each day. A serving is 1 cup of lettuce or raw leafy vegetables, ½ cup of chopped or cooked vegetables, or 4 ounces (½ cup) of vegetable juice. Choose vegetables more often than vegetable juice. · Get 2 to 3 servings of low-fat and fat-free dairy each day. A serving is 8 ounces of milk, 1 cup of yogurt, or 1 ½ ounces of cheese. · Eat 6 to 8 servings of grains each day. A serving is 1 slice of bread, 1 ounce of dry cereal, or ½ cup of cooked rice, pasta, or cooked cereal. Try to choose whole-grain products as much as possible. · Limit lean meat, poultry, and fish to 2 servings each day. A serving is 3 ounces, about the size of a deck of cards. · Eat 4 to 5 servings of nuts, seeds, and legumes (cooked dried beans, lentils, and split peas) each week. A serving is 1/3 cup of nuts, 2 tablespoons of seeds, or ½ cup of cooked beans or peas. · Limit fats and oils to 2 to 3 servings each day. A serving is 1 teaspoon of vegetable oil or 2 tablespoons of salad dressing. · Limit sweets and added sugars to 5 servings or less a week. A serving is 1 tablespoon jelly or jam, ½ cup sorbet, or 1 cup of lemonade. · Eat less than 2,300 milligrams (mg) of sodium a day. If you limit your sodium to 1,500 mg a day, you can lower your blood pressure even more. Tips for success · Start small. Do not try to make dramatic changes to your diet all at once. You might feel that you are missing out on your favorite foods and then be more likely to not follow the plan. Make small changes, and stick with them. Once those changes become habit, add a few more changes. · Try some of the following: ? Make it a goal to eat a fruit or vegetable at every meal and at snacks. This will make it easy to get the recommended amount of fruits and vegetables each day. ? Try yogurt topped with fruit and nuts for a snack or healthy dessert. ? Add lettuce, tomato, cucumber, and onion to sandwiches. ? Combine a ready-made pizza crust with low-fat mozzarella cheese and lots of vegetable toppings. Try using tomatoes, squash, spinach, broccoli, carrots, cauliflower, and onions. ? Have a variety of cut-up vegetables with a low-fat dip as an appetizer instead of chips and dip. ? Sprinkle sunflower seeds or chopped almonds over salads.  Or try adding chopped walnuts or almonds to cooked vegetables. ? Try some vegetarian meals using beans and peas. Add garbanzo or kidney beans to salads. Make burritos and tacos with mashed pitt beans or black beans. Where can you learn more? Go to http://www.gray.com/ Enter I083 in the search box to learn more about \"DASH Diet: Care Instructions. \" Current as of: December 16, 2019               Content Version: 12.6 © 6233-1387 [employer]. Care instructions adapted under license by [employer] (which disclaims liability or warranty for this information). If you have questions about a medical condition or this instruction, always ask your healthcare professional. [address] any warranty or liability for your use of this information. Golfer's Elbow: Care Instructions Your Care Instructions The pain and soreness in the inner part of your elbow is caused by a problem called golfer's elbow. Bending the wrist over and over again has hurt the tendons that attach to your inner elbow. The muscles in your forearm also may hurt. Golfer's elbow usually gets better with treatment at home. Follow-up care is a key part of your treatment and safety. Be sure to make and go to all appointments, and call your doctor if you are having problems. It's also a good idea to know your test results and keep a list of the medicines you take. How can you care for yourself at home? · Rest your elbow and wrist. Try to avoid movements that are painful. You may have to do this for weeks to months. Follow your doctor's directions for how long to rest. 
· Put ice or a cold pack on your elbow for 10 to 20 minutes at a time. Try to do this every 1 to 2 hours for the next 3 days (when you are awake) or until the swelling goes down. Put a thin cloth between the ice and your skin.  
· Prop up the sore arm on a pillow when you ice it or anytime you sit or lie down during the next 3 days. Try to keep it above the level of your heart. This will help reduce swelling. · Take pain medicine exactly as directed. ? If the doctor gave you a prescription medicine for pain, take it as prescribed. ? If you are not taking a prescription pain medicine, ask your doctor if you can take an over-the-counter medicine. · If your doctor gave you a brace or splint, use it as directed. A \"counterforce\" brace is a strap around the forearm, just below the elbow. It may ease the pressure on the tendon and may spread force throughout the arm. · Follow your doctor's or physical therapist's directions for exercise. To prevent golfer's elbow · After your elbow has healed, learn the best techniques for your work or sport. A physical or occupational therapist can help you. When should you call for help? Call your doctor now or seek immediate medical care if: 
  · Your pain gets worse.  
  · You cannot bend your elbow normally.  
  · You have tingling, weakness, or numbness in your hand and fingers.  
  · Your arm or hand is cool or pale or changes color. Watch closely for changes in your health, and be sure to contact your doctor if: 
  · You have work problems caused by your elbow pain.  
  · Your pain is not better after 2 weeks. Where can you learn more? Go to http://www.gray.com/ Enter B937 in the search box to learn more about \"Golfer's Elbow: Care Instructions. \" Current as of: March 2, 2020               Content Version: 12.6 © 8767-5181 [employer]. Care instructions adapted under license by [employer] (which disclaims liability or warranty for this information). If you have questions about a medical condition or this instruction, always ask your healthcare professional. [address] any warranty or liability for your use of this information. Golfer's Elbow: Exercises Introduction Here are some examples of exercises for you to try. The exercises may be suggested for a condition or for rehabilitation. Start each exercise slowly. Ease off the exercises if you start to have pain. You will be told when to start these exercises and which ones will work best for you. How to do the exercises Wrist extensor stretch 1. Extend your affected arm in front of you and make a fist with your palm facing down. 2. Bend your wrist so that your fist points toward the floor. 3. With your other hand, gently bend your wrist farther until you feel a mild to moderate stretch in your forearm. 4. Hold for at least 15 to 30 seconds. 5. Repeat 2 to 4 times. 6. Repeat steps 1 through 5 with your fingers pointing toward the floor. Forearm extensor stretch 1. Place your affected elbow down at your side, bent at about 90 degrees. Then make a fist with your palm facing down. 2. Keeping your wrist bent, slowly straighten your elbow so your arm is down at your side. Then twist your fist out so your palm is facing out to the side and you feel a stretch. 3. Hold for at least 15 to 30 seconds. 4. Repeat 2 to 4 times. Wrist flexor stretch 1. Extend your affected arm in front of you with your palm facing away from your body. 2. Bend back your wrist, pointing your hand up toward the ceiling. 3. With your other hand, gently bend your wrist farther until you feel a mild to moderate stretch in your forearm. 4. Hold for at least 15 to 30 seconds. 5. Repeat 2 to 4 times. 6. Repeat steps 1 through 5, but this time extend your affected arm in front of you with your palm facing up. Then bend back your wrist, pointing your hand toward the floor. Wrist curls 1. Place your forearm on a table with your hand hanging over the edge of the table, palm up. 2. Place a 1- to 2-pound weight in your hand. This may be a dumbbell, a can of food, or a filled water bottle. 3. Slowly raise and lower the weight while keeping your forearm on the table and your palm facing up. 4. Repeat this motion 8 to 12 times. 5. Switch arms, and do steps 1 through 4. 
6. Repeat with your hand facing down toward the floor. Switch arms. Resisted wrist extension 1. Sit leaning forward with your legs slightly spread. Then place your affected forearm on your thigh with your hand and wrist in front of your knee. 2. Grasp one end of an exercise band with your palm down, and step on the other end. 
3. Slowly bend your wrist upward for a count of 2, then lower your wrist slowly to a count of 5. 
4. Repeat 8 to 12 times. Resisted wrist flexion 1. Sit leaning forward with your legs slightly spread. Then place your affected forearm on your thigh with your hand and wrist in front of your knee. 2. Grasp one end of an exercise band with your palm up, and step on the other end. 
3. Slowly bend your wrist upward for a count of 2, then lower your wrist slowly to a count of 5. 
4. Repeat 8 to 12 times. Neck stretch to the side 1. This stretch works best if you keep your shoulder down as you lean away from it. To help you remember to do this, start by relaxing your shoulders and lightly holding on to your thighs or your chair. 2. Tilt your head away from your affected elbow and toward your opposite shoulder. For example, if your right elbow is sore, keep your right shoulder down as you lean your head toward your left shoulder. 3. Hold for 15 to 30 seconds. Let the weight of your head stretch your muscles. 4. If you would like a little added stretch, use your hand to gently and steadily pull your head toward your shoulder. For example, if your right elbow is sore, use your left hand to gently pull your head toward your left shoulder. 5. Repeat 2 to 4 times. Resisted forearm pronation 1. Sit leaning forward with your legs slightly spread.  Then place your affected forearm on your thigh with your hand and wrist in front of your knee. 2. Grasp one end of an exercise band with your palm up, and step on the other end. 3. Keeping your wrist straight, roll your palm inward toward your thigh for a count of 2, then slowly move your wrist back to the starting position to a count of 5. 
4. Repeat 8 to 12 times. Resisted supination 1. Sit leaning forward with your legs slightly spread. Then place your affected forearm on your thigh with your hand and wrist in front of your knee. 2. Grasp one end of an exercise band with your palm down, and step on the other end. 3. Keeping your wrist straight, roll your palm outward and away from your thigh for a count of 2, then slowly move your wrist back to the starting position to a count of 5. 
4. Repeat 8 to 12 times. Follow-up care is a key part of your treatment and safety. Be sure to make and go to all appointments, and call your doctor if you are having problems. It's also a good idea to know your test results and keep a list of the medicines you take. Where can you learn more? Go to http://www.gray.com/ Enter [phone] in the search box to learn more about \"Golfer's Elbow: Exercises. \" Current as of: March 2, 2020               Content Version: 12.6 © 5633-9839 [employer]. Care instructions adapted under license by [employer] (which disclaims liability or warranty for this information). If you have questions about a medical condition or this instruction, always ask your healthcare professional. [place] [address] any warranty or liability for your use of this information.

## 2020-10-21 NOTE — PROGRESS NOTES
Subjective:     60 y.o. female for annual Comprehensive personal healthcare examination. History of present illness: This patient has multiple medical problems. These include:     Mrs. Lizbeth Tang is a 57-year-old  female, mother of 2 who presents to the office today for complete checkup. Medical history is been pertinent for the following    The patient has hypercholesterolemia but is currently managing this with diet. She has no history of coronary artery disease and denies exertional chest pains or claudication. 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  This resolved and she has had no reoccurrence of her abdominal symptoms. She is up-to-date on colonoscopy and offers no GI complaints today. She was recently seen for medial epicondylitis of the right elbow. She was given diclofenac and notes improvement of her symptoms and is started on an exercise program.    The patient has been under a lot of stress recently with the coronavirus infection, her work, family situations. She has had symptoms of anxiety and depression and scores in the mild to moderate depressive range in the PHQ testing. The patient is planning on seeing a counselor. Patient Active Problem List   Diagnosis Code    Hypercholesterolemia E78.00    TMJ dysfunction M26.609    Mesenteric panniculitis ([facility] [address] K65.4    Medial epicondylitis of right elbow M77.01    Depression F32.9      Past Medical History:   Diagnosis Date    Acute bacterial pharyngitis 8/8/2017    Cellulitis 8/8/2017    Exposure to influenza 8/8/2017    Hypercholesterolemia 8/8/2017    Low back pain 8/8/2017    Menopause 8/8/2017    TMJ dysfunction 8/9/2017     Past Surgical History:   Procedure Laterality Date    HX GYN      2 c sections 2002 & 2006     No Known Allergies  Current Outpatient Medications   Medication Sig Dispense Refill    diclofenac EC (VOLTAREN) 75 mg EC tablet Take 1 Tab by mouth two (2) times a day.  60 Tab 0 Social History     Socioeconomic History    Marital status:      Spouse name: Not on file    Number of children: 2    Years of education: Not on file    Highest education level: Not on file   Tobacco Use    Smoking status: Never Smoker    Smokeless tobacco: Never Used   Substance and Sexual Activity    Alcohol use: Yes     Alcohol/week: 14.0 standard drinks     Types: 14 Glasses of wine per week    Drug use: No     Family History   Problem Relation Age of Onset    Cancer Mother         breast and bladder     Heart Disease Father        Health Maintenance   Topic Date Due    Hepatitis C Screening  1965    DTaP/Tdap/Td series (1 - Tdap) 03/15/1984    PAP AKA CERVICAL CYTOLOGY  03/15/1984    Shingrix Vaccine Age 50> (1 of 2) 03/15/2013    Colorectal Cancer Screening Combo  03/15/2013    Breast Cancer Screen Mammogram  03/15/2013    Lipid Screen  10/13/2022    Flu Vaccine  Completed    Pneumococcal 0-64 years  Aged Out       Review of Systems  Constitutional:  She denies fever, weight loss, sweats or fatigue. HEENT:  No blurred or double vision, headache or dizziness. No difficulty with swallowing, taste, speech or smell. Respiratory:  No cough, wheezing or shortness of breath. No sputum production. Cardiac:  Denies chest pain, palpitations, unexplained indigestion, syncope, edema, PND or orthopnea. GI:  No changes in bowel movements, no abdominal pain, no bloating, anorexia, nausea, vomiting or heartburn. :  No frequency or dysuria. Denies incontinence. Extremities:  No joint pain, stiffness or swelling. Skin:  No recent rashes or mole changes. Neurological:  No numbness, tingling, burning paresthesias or loss of motor strength. No syncope, dizziness, frequent headaches or memory loss.   Psych: Positive for symptoms of anxiety and depression related to coronavirus pandemic, work related issues and family issues  ROS otherwise negative      Objective:     Vitals: 10/21/20 1012   BP: 128/78   Pulse: 79   Resp: 20   Temp: 98 °F (36.7 °C)   TempSrc: Oral   SpO2: 97%   Weight: 181 lb 9.6 oz (82.4 kg)   Height: 5' 4\" (1.626 m)   PainSc:   0 - No pain     Body mass index is 31.17 kg/m². Physical Examination:   General Appearance:  Well-developed, well-nourished, no acute distress. Vision:  Deferred to ophthalmologist.       HEENT:    Ears:  The TMs and ear canals were clear. Eyes:  The pupillary responses were normal.  Extraocular muscle function intact. Lids and conjunctiva not injected. No conjunctiva redness or drainage. Pharynx:  Clear with teeth in good repair. No masses were noted. Neck:  Supple without thyromegaly or adenopathy. No JVD noted. No carotid bruits. Lungs:  Clear to auscultation and percussion. Cardiac:  Regular rate and rhythm without murmur. PMI not displaced. No gallop, rub or click. Abdomen:  Flat, soft, non-tender without palpable organomegaly or mass. No pulsatile mass was felt, and no bruit was heard. Bowel sounds were active. Breasts:  Deferred to GYN  GYN: Deferred to GYN    Rectal exam: Deferred to GYN    Extremities:  No clubbing, cyanosis or edema. Pulses:  Dorsalis pedis and posterior tibial pulses felt without difficulty. Skin:  No rash or unusual mole changes noted. Lymph Nodes:  None felt in the cervical, supraclavicular, axillary or inguinal region. Neurological:  Cranial nerves II-XII grossly intact. Motor strength 5/5. DTRs 2+ and symmetric.   Station and gait normal.  Physical exam otherwise negative        Assessment/Plan:     Diagnoses and all orders for this visit:    Routine physical examination  -     CBC WITH AUTOMATED DIFF  -     METABOLIC PANEL, COMPREHENSIVE  -     LIPID PANEL  -     TSH 3RD GENERATION  -     URINALYSIS W/ RFLX MICROSCOPIC  -     COLLECTION VENOUS BLOOD,VENIPUNCTURE    Hypercholesterolemia    Mesenteric panniculitis (HCC)    Medial epicondylitis of right elbow    Depression, unspecified depression type    Need for hepatitis C screening test  -     HEPATITIS C AB        Other instructions: The patient's medications were reviewed and reconciled. A prudent diet and weight loss is encouraged    Patient shows signs of mild to moderate anxiety/depression but declines medication management and will be seeing a counselor to address these issues. Health maintenance issues were reviewed and CDC recommended hepatitis C screening will be obtained. Pap testing and mammography are up-to-date and were done at the [facility] and she will have copies of these forwarded to us. Colonoscopy is up-to-date and was done at the [facility] and we will obtain a copy of this. Age-appropriate vaccinations were reviewed and she will be in need of Tdap and shingles vaccine which she will get in the future. Await results of multiple labs    Follow-up yearly    Follow-up and Dispositions    · Return in about 1 year (around 10/21/2021). Joaquim Thomas MD     Please note that this dictation was completed with [employer], the computer voice recognition software. Quite often unanticipated grammatical, syntax, homophones, and other interpretive errors are inadvertently transcribed by the computer software. Please disregard these errors. Please excuse any errors that have escaped final proofreading.

## 2020-10-22 ENCOUNTER — TELEPHONE (OUTPATIENT)
Dept: INTERNAL MEDICINE CLINIC | Age: 55
End: 2020-10-22

## 2020-10-22 LAB
ALBUMIN SERPL-MCNC: 4.7 G/DL (ref 3.8–4.9)
ALBUMIN/GLOB SERPL: 1.6 {RATIO} (ref 1.2–2.2)
ALP SERPL-CCNC: 98 IU/L (ref 39–117)
ALT SERPL-CCNC: 29 IU/L (ref 0–32)
APPEARANCE UR: CLEAR
AST SERPL-CCNC: 29 IU/L (ref 0–40)
BACTERIA #/AREA URNS HPF: ABNORMAL /[HPF]
BASOPHILS # BLD AUTO: 0.1 X10E3/UL (ref 0–0.2)
BASOPHILS NFR BLD AUTO: 1 %
BILIRUB SERPL-MCNC: 0.3 MG/DL (ref 0–1.2)
BILIRUB UR QL STRIP: NEGATIVE
BUN SERPL-MCNC: 16 MG/DL (ref 6–24)
BUN/CREAT SERPL: 19 (ref 9–23)
CALCIUM SERPL-MCNC: 9.8 MG/DL (ref 8.7–10.2)
CASTS URNS QL MICRO: ABNORMAL /LPF
CHLORIDE SERPL-SCNC: 103 MMOL/L (ref 96–106)
CHOLEST SERPL-MCNC: 296 MG/DL (ref 100–199)
CO2 SERPL-SCNC: 23 MMOL/L (ref 20–29)
COLOR UR: YELLOW
CREAT SERPL-MCNC: 0.84 MG/DL (ref 0.57–1)
EOSINOPHIL # BLD AUTO: 0.1 X10E3/UL (ref 0–0.4)
EOSINOPHIL NFR BLD AUTO: 2 %
EPI CELLS #/AREA URNS HPF: >10 /HPF (ref 0–10)
ERYTHROCYTE [DISTWIDTH] IN BLOOD BY AUTOMATED COUNT: 13.2 % (ref 11.7–15.4)
GLOBULIN SER CALC-MCNC: 2.9 G/DL (ref 1.5–4.5)
GLUCOSE SERPL-MCNC: 96 MG/DL (ref 65–99)
GLUCOSE UR QL: NEGATIVE
HCT VFR BLD AUTO: 48.9 % (ref 34–46.6)
HCV AB S/CO SERPL IA: <0.1 S/CO RATIO (ref 0–0.9)
HDLC SERPL-MCNC: 61 MG/DL
HGB BLD-MCNC: 16 G/DL (ref 11.1–15.9)
HGB UR QL STRIP: ABNORMAL
IMM GRANULOCYTES # BLD AUTO: 0.1 X10E3/UL (ref 0–0.1)
IMM GRANULOCYTES NFR BLD AUTO: 1 %
KETONES UR QL STRIP: NEGATIVE
LDLC SERPL CALC-MCNC: 174 MG/DL (ref 0–99)
LEUKOCYTE ESTERASE UR QL STRIP: NEGATIVE
LYMPHOCYTES # BLD AUTO: 2.9 X10E3/UL (ref 0.7–3.1)
LYMPHOCYTES NFR BLD AUTO: 39 %
MCH RBC QN AUTO: 31.3 PG (ref 26.6–33)
MCHC RBC AUTO-ENTMCNC: 32.7 G/DL (ref 31.5–35.7)
MCV RBC AUTO: 96 FL (ref 79–97)
MICRO URNS: ABNORMAL
MONOCYTES # BLD AUTO: 0.4 X10E3/UL (ref 0.1–0.9)
MONOCYTES NFR BLD AUTO: 6 %
MUCOUS THREADS URNS QL MICRO: PRESENT
NEUTROPHILS # BLD AUTO: 3.9 X10E3/UL (ref 1.4–7)
NEUTROPHILS NFR BLD AUTO: 51 %
NITRITE UR QL STRIP: NEGATIVE
PH UR STRIP: 5.5 [PH] (ref 5–7.5)
PLATELET # BLD AUTO: 288 X10E3/UL (ref 150–450)
POTASSIUM SERPL-SCNC: 4.4 MMOL/L (ref 3.5–5.2)
PROT SERPL-MCNC: 7.6 G/DL (ref 6–8.5)
PROT UR QL STRIP: ABNORMAL
RBC # BLD AUTO: 5.11 X10E6/UL (ref 3.77–5.28)
RBC #/AREA URNS HPF: ABNORMAL /HPF (ref 0–2)
SODIUM SERPL-SCNC: 140 MMOL/L (ref 134–144)
SP GR UR: 1.03 (ref 1–1.03)
TRIGL SERPL-MCNC: 320 MG/DL (ref 0–149)
TSH SERPL DL<=0.005 MIU/L-ACNC: 2 UIU/ML (ref 0.45–4.5)
UROBILINOGEN UR STRIP-MCNC: 0.2 MG/DL (ref 0.2–1)
VLDLC SERPL CALC-MCNC: 61 MG/DL (ref 5–40)
WBC # BLD AUTO: 7.5 X10E3/UL (ref 3.4–10.8)
WBC #/AREA URNS HPF: ABNORMAL /HPF (ref 0–5)

## 2020-10-22 RX ORDER — ROSUVASTATIN CALCIUM 10 MG/1
10 TABLET, COATED ORAL DAILY
Qty: 30 TAB | Refills: 1 | Status: SHIPPED | OUTPATIENT
Start: 2020-10-22 | End: 2020-12-29 | Stop reason: SDUPTHER

## 2020-10-22 NOTE — TELEPHONE ENCOUNTER
----- Message from Oumar Monreal MD sent at 10/22/2020 11:53 AM EDT -----  Total cholesterol almost 300, triglycerides 320, LDL elevated at 174. Recommend start of statin therapy. Crestor 10 mg daily if agreeable.   Check fasting lipid profile, LFTs and CPK in 1 month

## 2020-10-22 NOTE — TELEPHONE ENCOUNTER
Patient advised of lab results and is agreeable to starting medication. Please send pended Rx to [facility]. Requested Prescriptions     Pending Prescriptions Disp Refills    rosuvastatin (CRESTOR) 10 mg tablet 30 Tab 1     Sig: Take 1 Tab by mouth daily.

## 2020-10-22 NOTE — PROGRESS NOTES
Total cholesterol almost 300, triglycerides 320, LDL elevated at 174. Recommend start of statin therapy. Crestor 10 mg daily if agreeable.   Check fasting lipid profile, LFTs and CPK in 1 month

## 2020-11-23 ENCOUNTER — TELEPHONE (OUTPATIENT)
Dept: INTERNAL MEDICINE CLINIC | Age: 55
End: 2020-11-23

## 2020-11-23 ENCOUNTER — LAB ONLY (OUTPATIENT)
Dept: INTERNAL MEDICINE CLINIC | Age: 55
End: 2020-11-23

## 2020-11-23 DIAGNOSIS — E78.00 HYPERCHOLESTEROLEMIA: Primary | ICD-10-CM

## 2020-11-23 NOTE — TELEPHONE ENCOUNTER
Patient states she was seen for a pain in her elbow. The medication worked but the pain is back. What is her next step? Should she get a cortisone shot? Will this help it get better or is this a temporary fix?      [phone]

## 2020-11-24 LAB
ALBUMIN SERPL-MCNC: 4.5 G/DL (ref 3.8–4.9)
ALP SERPL-CCNC: 91 IU/L (ref 39–117)
ALT SERPL-CCNC: 25 IU/L (ref 0–32)
AST SERPL-CCNC: 23 IU/L (ref 0–40)
BILIRUB DIRECT SERPL-MCNC: 0.08 MG/DL (ref 0–0.4)
BILIRUB SERPL-MCNC: 0.2 MG/DL (ref 0–1.2)
CHOLEST SERPL-MCNC: 183 MG/DL (ref 100–199)
CK SERPL-CCNC: 141 U/L (ref 32–182)
HDLC SERPL-MCNC: 66 MG/DL
LDLC SERPL CALC-MCNC: 89 MG/DL (ref 0–99)
PROT SERPL-MCNC: 7.3 G/DL (ref 6–8.5)
TRIGL SERPL-MCNC: 166 MG/DL (ref 0–149)
VLDLC SERPL CALC-MCNC: 28 MG/DL (ref 5–40)

## 2020-12-29 RX ORDER — ROSUVASTATIN CALCIUM 10 MG/1
10 TABLET, COATED ORAL DAILY
Qty: 30 TAB | Refills: 6 | Status: SHIPPED | OUTPATIENT
Start: 2020-12-29 | End: 2021-09-20

## 2020-12-29 NOTE — TELEPHONE ENCOUNTER
Seen for checkup on 10/21.   Would return for follow-up 6 months after that appointment and we will do labs at that time

## 2020-12-29 NOTE — TELEPHONE ENCOUNTER
Patient scheduled a follow up appointment for:  Next Appt  4/16/21 - Lore Dunn MD - [facility] Leslye Habermann    Please send pended refill to pharmacy  Requested Prescriptions     Pending Prescriptions Disp Refills    rosuvastatin (CRESTOR) 10 mg tablet 30 Tab 6     Sig: Take 1 Tab by mouth daily.

## 2020-12-29 NOTE — TELEPHONE ENCOUNTER
Patient would like to know if you want her to continue crestor or come back for labs?     [phone] Leave a message if necessary

## 2021-03-12 ENCOUNTER — TELEPHONE (OUTPATIENT)
Dept: INTERNAL MEDICINE CLINIC | Age: 56
End: 2021-03-12

## 2021-03-12 RX ORDER — SULFACETAMIDE SODIUM 100 MG/ML
2 SOLUTION/ DROPS OPHTHALMIC 4 TIMES DAILY
Qty: 15 ML | Refills: 0 | Status: SHIPPED | OUTPATIENT
Start: 2021-03-12 | End: 2021-04-28 | Stop reason: ALTCHOICE

## 2021-03-12 NOTE — TELEPHONE ENCOUNTER
Patient advised- please send pended Rx to [facility]:  Requested Prescriptions     Pending Prescriptions Disp Refills    sulfacetamide (BLEPH-10) 10 % ophthalmic solution 15 mL 0     Sig: Administer 2 Drops to both eyes four (4) times daily.

## 2021-03-12 NOTE — TELEPHONE ENCOUNTER
We can call in some Adriel eyedrops-2 drops into each eye 4 times a day to see if this helps over the weekend. Should apply warm compresses to the eyes as well 4 times a day.   If no better by Monday will need to be seen by her eye specialist

## 2021-03-12 NOTE — TELEPHONE ENCOUNTER
Patient states both of her eyes are watery. They have been crusty this week. Her right eye won't stop watering today. Does she need to see an eye doctor or come here?      [phone]

## 2021-04-28 ENCOUNTER — OFFICE VISIT (OUTPATIENT)
Dept: INTERNAL MEDICINE CLINIC | Age: 56
End: 2021-04-28
Payer: COMMERCIAL

## 2021-04-28 VITALS
WEIGHT: 181.2 LBS | DIASTOLIC BLOOD PRESSURE: 78 MMHG | TEMPERATURE: 97.9 F | SYSTOLIC BLOOD PRESSURE: 120 MMHG | HEIGHT: 64 IN | BODY MASS INDEX: 30.93 KG/M2 | OXYGEN SATURATION: 96 % | HEART RATE: 66 BPM | RESPIRATION RATE: 20 BRPM

## 2021-04-28 DIAGNOSIS — F32.A DEPRESSION, UNSPECIFIED DEPRESSION TYPE: ICD-10-CM

## 2021-04-28 DIAGNOSIS — E78.00 HYPERCHOLESTEROLEMIA: Primary | ICD-10-CM

## 2021-04-28 DIAGNOSIS — Z79.899 LONG-TERM USE OF HIGH-RISK MEDICATION: ICD-10-CM

## 2021-04-28 PROBLEM — K65.4 MESENTERIC PANNICULITIS (HCC): Status: RESOLVED | Noted: 2019-08-14 | Resolved: 2021-04-28

## 2021-04-28 PROCEDURE — 99213 OFFICE O/P EST LOW 20 MIN: CPT | Performed by: INTERNAL MEDICINE

## 2021-04-28 RX ORDER — BUPROPION HYDROCHLORIDE 150 MG/1
150 TABLET ORAL DAILY
COMMUNITY
Start: 2021-04-01 | End: 2021-10-29 | Stop reason: SDUPTHER

## 2021-04-28 NOTE — PATIENT INSTRUCTIONS
DASH Diet: Care Instructions Your Care Instructions The DASH diet is an eating plan that can help lower your blood pressure. DASH stands for Dietary Approaches to Stop Hypertension. Hypertension is high blood pressure. The DASH diet focuses on eating foods that are high in calcium, potassium, and magnesium. These nutrients can lower blood pressure. The foods that are highest in these nutrients are fruits, vegetables, low-fat dairy products, nuts, seeds, and legumes. But taking calcium, potassium, and magnesium supplements instead of eating foods that are high in those nutrients does not have the same effect. The DASH diet also includes whole grains, fish, and poultry. The DASH diet is one of several lifestyle changes your doctor may recommend to lower your high blood pressure. Your doctor may also want you to decrease the amount of sodium in your diet. Lowering sodium while following the DASH diet can lower blood pressure even further than just the DASH diet alone. Follow-up care is a key part of your treatment and safety. Be sure to make and go to all appointments, and call your doctor if you are having problems. It's also a good idea to know your test results and keep a list of the medicines you take. How can you care for yourself at home? Following the DASH diet · Eat 4 to 5 servings of fruit each day. A serving is 1 medium-sized piece of fruit, ½ cup chopped or canned fruit, 1/4 cup dried fruit, or 4 ounces (½ cup) of fruit juice. Choose fruit more often than fruit juice. · Eat 4 to 5 servings of vegetables each day. A serving is 1 cup of lettuce or raw leafy vegetables, ½ cup of chopped or cooked vegetables, or 4 ounces (½ cup) of vegetable juice. Choose vegetables more often than vegetable juice. · Get 2 to 3 servings of low-fat and fat-free dairy each day. A serving is 8 ounces of milk, 1 cup of yogurt, or 1 ½ ounces of cheese. · Eat 6 to 8 servings of grains each day.  A serving is 1 slice of bread, 1 ounce of dry cereal, or ½ cup of cooked rice, pasta, or cooked cereal. Try to choose whole-grain products as much as possible. · Limit lean meat, poultry, and fish to 2 servings each day. A serving is 3 ounces, about the size of a deck of cards. · Eat 4 to 5 servings of nuts, seeds, and legumes (cooked dried beans, lentils, and split peas) each week. A serving is 1/3 cup of nuts, 2 tablespoons of seeds, or ½ cup of cooked beans or peas. · Limit fats and oils to 2 to 3 servings each day. A serving is 1 teaspoon of vegetable oil or 2 tablespoons of salad dressing. · Limit sweets and added sugars to 5 servings or less a week. A serving is 1 tablespoon jelly or jam, ½ cup sorbet, or 1 cup of lemonade. · Eat less than 2,300 milligrams (mg) of sodium a day. If you limit your sodium to 1,500 mg a day, you can lower your blood pressure even more. · Be aware that all of these are the suggested number of servings for people who eat 1,800 to 2,000 calories a day. Your recommended number of servings may be different if you need more or fewer calories. Tips for success · Start small. Do not try to make dramatic changes to your diet all at once. You might feel that you are missing out on your favorite foods and then be more likely to not follow the plan. Make small changes, and stick with them. Once those changes become habit, add a few more changes. · Try some of the following: ? Make it a goal to eat a fruit or vegetable at every meal and at snacks. This will make it easy to get the recommended amount of fruits and vegetables each day. ? Try yogurt topped with fruit and nuts for a snack or healthy dessert. ? Add lettuce, tomato, cucumber, and onion to sandwiches. ? Combine a ready-made pizza crust with low-fat mozzarella cheese and lots of vegetable toppings. Try using tomatoes, squash, spinach, broccoli, carrots, cauliflower, and onions. ?  Have a variety of cut-up vegetables with a low-fat dip as an appetizer instead of chips and dip. ? Sprinkle sunflower seeds or chopped almonds over salads. Or try adding chopped walnuts or almonds to cooked vegetables. ? Try some vegetarian meals using beans and peas. Add garbanzo or kidney beans to salads. Make burritos and tacos with mashed pitt beans or black beans. Where can you learn more? Go to http://www.gray.com/ Enter U673 in the search box to learn more about \"DASH Diet: Care Instructions. \" Current as of: August 31, 2020               Content Version: 12.8 © 7213-8155 [employer]. Care instructions adapted under license by [employer] (which disclaims liability or warranty for this information). If you have questions about a medical condition or this instruction, always ask your healthcare professional. [address] any warranty or liability for your use of this information.

## 2021-04-28 NOTE — PROGRESS NOTES
Subjective:     Mrs. Lauro Mcneil returns to the office today in general medical follow-up. At the last office visit we placed her on Crestor due to her dyslipidemia. At that time her cholesterol was almost 300 and LDL was 174. We started her on Crestor 10 mg a day and rechecked her labs 1 month later. Her total cholesterol was 183 with HDL 66 and LDL of 89. Triglycerides were almost normal.  She has tolerated the Crestor without muscle soreness or GI upset. She has no history of coronary disease and denies exertional chest pains or claudication. The patient also complained at that time symptoms of depression. The patient was going to talk with a counselor but instead talked with her gynecologist.  They arranged for her to be seen by telemedicine with a psychiatrist who agreed that she was depressed and recommended Wellbutrin which she was placed on. States that this seems to have helped although she is not fully back to her baseline. She has tolerated her medication without weight gain. Past Medical History:   Diagnosis Date    Acute bacterial pharyngitis 8/8/2017    Cellulitis 8/8/2017    Exposure to influenza 8/8/2017    Hypercholesterolemia 8/8/2017    Low back pain 8/8/2017    Menopause 8/8/2017    TMJ dysfunction 8/9/2017     Past Surgical History:   Procedure Laterality Date    HX GYN      2 c sections 2002 & 2006     No Known Allergies  Current Outpatient Medications   Medication Sig Dispense Refill    buPROPion XL (WELLBUTRIN XL) 150 mg tablet 150 mg daily.  rosuvastatin (CRESTOR) 10 mg tablet Take 1 Tab by mouth daily. 27 Tab 6     Social History     Socioeconomic History    Marital status:      Spouse name: Not on file    Number of children: 2    Years of education: Not on file    Highest education level: Not on file   Tobacco Use    Smoking status: Never Smoker    Smokeless tobacco: Never Used   Substance and Sexual Activity    Alcohol use:  Yes     Alcohol/week: 14.0 standard drinks     Types: 14 Glasses of wine per week    Drug use: No     Family History   Problem Relation Age of Onset    Cancer Mother         breast and bladder     Heart Disease Father        Review of Systems:  GEN: no weight loss, weight gain, fatigue or night sweats  CV: no PND, orthopnea, or palpitations  Resp: no dyspnea on exertion, no cough  Abd: no nausea, vomiting or diarrhea  EXT: denies edema, claudication  Endocrine: no hair loss, excessive thirst or polyuria  Neurological ROS: no TIA or stroke symptoms  ROS otherwise negative      Objective:     Visit Vitals  /78 (BP 1 Location: Left upper arm, BP Patient Position: Sitting, BP Cuff Size: Adult)   Pulse 66   Temp 97.9 °F (36.6 °C) (Temporal)   Resp 20   Ht 5' 4\" (1.626 m)   Wt 181 lb 3.2 oz (82.2 kg)   SpO2 96%   BMI 31.10 kg/m²     Body mass index is 31.1 kg/m². General:   alert, cooperative and no distress   Eyes: conjunctivae/sclerae clear. PERRL, EOM's intact   Mouth:  No oral lesions, no pharyngeal erythema, no exudates   Neck: Trachea midline, no thyromegaly, no bruits   Heart: S1 and S2 normal,no murmurs noted    Lungs: Clear to auscultation bilaterally, no increased work of breathing   Abdomen: Soft, nontender. Normal bowel sounds   Extremities: No edema or cyanosis   Neuro: ..alert, oriented x3,speech normal in context and clarity, cranial nerves II-XII intact,motor strength: full proximally and distally,gait: normal  reflexes: full and symmetric     Physical exam otherwise negative         Assessment/Plan:     Diagnoses and all orders for this visit:    Hypercholesterolemia    Long-term use of high-risk medication    Depression, unspecified depression type        Other instructions: The patient's medications were reviewed and reconciled. No change in her current medical regimen will be made.     A prudent diet and exercise is encouraged    Weight loss recommended with body mass index of 31.1    Health maintenance issues were reviewed and her mammogram is up-to-date and she will ask for a copy of this to be sent to us. Tdap and shingles vaccine series has been recommended    Follow-up 6 months    Follow-up and Dispositions    · Return in about 1 year (around 4/28/2022). Danelle Odom MD    Please note that this dictation was completed with [employer]um, the computer voice recognition software. Quite often unanticipated grammatical, syntax, homophones, and other interpretive errors are inadvertently transcribed by the computer software. Please disregard these errors. Please excuse any errors that have escaped final proofreading.

## 2021-04-28 NOTE — PROGRESS NOTES
Morena Hanley is a 60 y.o. female presenting for Follow Up Chronic Condition (6 mo fu)  . 1. Have you been to the ER, urgent care clinic since your last visit? Hospitalized since your last visit? No    2. Have you seen or consulted any other health care providers outside of the [address] [place] [address] since your last visit? Include any pap smears or colon screening. GYN, Ortho    No flowsheet data found. No flowsheet data found.     3 most recent PHQ Screens 4/28/2021   Little interest or pleasure in doing things Not at all   Feeling down, depressed, irritable, or hopeless Not at all   Total Score PHQ 2 0   Trouble falling or staying asleep, or sleeping too much Not at all   Feeling tired or having little energy Not at all   Poor appetite, weight loss, or overeating Not at all   Feeling bad about yourself - or that you are a failure or have let yourself or your family down Not at all   Trouble concentrating on things such as school, work, reading, or watching TV Not at all   Moving or speaking so slowly that other people could have noticed; or the opposite being so fidgety that others notice Not at all   Thoughts of being better off dead, or hurting yourself in some way Not at all   PHQ 9 Score 0   How difficult have these problems made it for you to do your work, take care of your home and get along with others Somewhat difficult       Medications Discontinued During This Encounter   Medication Reason    diclofenac EC (VOLTAREN) 75 mg EC tablet Therapy Completed    sulfacetamide (BLEPH-10) 10 % ophthalmic solution Therapy Completed

## 2021-10-29 ENCOUNTER — OFFICE VISIT (OUTPATIENT)
Dept: INTERNAL MEDICINE CLINIC | Age: 56
End: 2021-10-29
Payer: COMMERCIAL

## 2021-10-29 VITALS
OXYGEN SATURATION: 97 % | SYSTOLIC BLOOD PRESSURE: 126 MMHG | RESPIRATION RATE: 20 BRPM | WEIGHT: 179.6 LBS | DIASTOLIC BLOOD PRESSURE: 76 MMHG | BODY MASS INDEX: 30.66 KG/M2 | TEMPERATURE: 98.2 F | HEART RATE: 77 BPM | HEIGHT: 64 IN

## 2021-10-29 DIAGNOSIS — Z23 NEEDS FLU SHOT: ICD-10-CM

## 2021-10-29 DIAGNOSIS — Z79.899 LONG-TERM USE OF HIGH-RISK MEDICATION: ICD-10-CM

## 2021-10-29 DIAGNOSIS — F32.A DEPRESSION, UNSPECIFIED DEPRESSION TYPE: ICD-10-CM

## 2021-10-29 DIAGNOSIS — E78.00 HYPERCHOLESTEROLEMIA: Primary | ICD-10-CM

## 2021-10-29 PROCEDURE — 90471 IMMUNIZATION ADMIN: CPT | Performed by: INTERNAL MEDICINE

## 2021-10-29 PROCEDURE — 90686 IIV4 VACC NO PRSV 0.5 ML IM: CPT | Performed by: INTERNAL MEDICINE

## 2021-10-29 PROCEDURE — 99213 OFFICE O/P EST LOW 20 MIN: CPT | Performed by: INTERNAL MEDICINE

## 2021-10-29 RX ORDER — BUPROPION HYDROCHLORIDE 300 MG/1
300 TABLET ORAL DAILY
Qty: 90 TABLET | Refills: 1 | Status: SHIPPED | OUTPATIENT
Start: 2021-10-29 | End: 2022-11-01

## 2021-10-29 NOTE — PATIENT INSTRUCTIONS
DASH Diet: Care Instructions  Your Care Instructions     The DASH diet is an eating plan that can help lower your blood pressure. DASH stands for Dietary Approaches to Stop Hypertension. Hypertension is high blood pressure. The DASH diet focuses on eating foods that are high in calcium, potassium, and magnesium. These nutrients can lower blood pressure. The foods that are highest in these nutrients are fruits, vegetables, low-fat dairy products, nuts, seeds, and legumes. But taking calcium, potassium, and magnesium supplements instead of eating foods that are high in those nutrients does not have the same effect. The DASH diet also includes whole grains, fish, and poultry. The DASH diet is one of several lifestyle changes your doctor may recommend to lower your high blood pressure. Your doctor may also want you to decrease the amount of sodium in your diet. Lowering sodium while following the DASH diet can lower blood pressure even further than just the DASH diet alone. Follow-up care is a key part of your treatment and safety. Be sure to make and go to all appointments, and call your doctor if you are having problems. It's also a good idea to know your test results and keep a list of the medicines you take. How can you care for yourself at home? Following the DASH diet  · Eat 4 to 5 servings of fruit each day. A serving is 1 medium-sized piece of fruit, ½ cup chopped or canned fruit, 1/4 cup dried fruit, or 4 ounces (½ cup) of fruit juice. Choose fruit more often than fruit juice. · Eat 4 to 5 servings of vegetables each day. A serving is 1 cup of lettuce or raw leafy vegetables, ½ cup of chopped or cooked vegetables, or 4 ounces (½ cup) of vegetable juice. Choose vegetables more often than vegetable juice. · Get 2 to 3 servings of low-fat and fat-free dairy each day. A serving is 8 ounces of milk, 1 cup of yogurt, or 1 ½ ounces of cheese. · Eat 6 to 8 servings of grains each day.  A serving is 1 slice of bread, 1 ounce of dry cereal, or ½ cup of cooked rice, pasta, or cooked cereal. Try to choose whole-grain products as much as possible. · Limit lean meat, poultry, and fish to 2 servings each day. A serving is 3 ounces, about the size of a deck of cards. · Eat 4 to 5 servings of nuts, seeds, and legumes (cooked dried beans, lentils, and split peas) each week. A serving is 1/3 cup of nuts, 2 tablespoons of seeds, or ½ cup of cooked beans or peas. · Limit fats and oils to 2 to 3 servings each day. A serving is 1 teaspoon of vegetable oil or 2 tablespoons of salad dressing. · Limit sweets and added sugars to 5 servings or less a week. A serving is 1 tablespoon jelly or jam, ½ cup sorbet, or 1 cup of lemonade. · Eat less than 2,300 milligrams (mg) of sodium a day. If you limit your sodium to 1,500 mg a day, you can lower your blood pressure even more. · Be aware that all of these are the suggested number of servings for people who eat 1,800 to 2,000 calories a day. Your recommended number of servings may be different if you need more or fewer calories. Tips for success  · Start small. Do not try to make dramatic changes to your diet all at once. You might feel that you are missing out on your favorite foods and then be more likely to not follow the plan. Make small changes, and stick with them. Once those changes become habit, add a few more changes. · Try some of the following:  ? Make it a goal to eat a fruit or vegetable at every meal and at snacks. This will make it easy to get the recommended amount of fruits and vegetables each day. ? Try yogurt topped with fruit and nuts for a snack or healthy dessert. ? Add lettuce, tomato, cucumber, and onion to sandwiches. ? Combine a ready-made pizza crust with low-fat mozzarella cheese and lots of vegetable toppings. Try using tomatoes, squash, spinach, broccoli, carrots, cauliflower, and onions. ?  Have a variety of cut-up vegetables with a low-fat dip as an appetizer instead of chips and dip. ? Sprinkle sunflower seeds or chopped almonds over salads. Or try adding chopped walnuts or almonds to cooked vegetables. ? Try some vegetarian meals using beans and peas. Add garbanzo or kidney beans to salads. Make burritos and tacos with mashed pitt beans or black beans. Where can you learn more? Go to http://www.jenkins.com/  Enter H967 in the search box to learn more about \"DASH Diet: Care Instructions. \"  Current as of: April 29, 2021               Content Version: 13.0  © 4645-6057 [employer]. Care instructions adapted under license by [employer] (which disclaims liability or warranty for this information). If you have questions about a medical condition or this instruction, always ask your healthcare professional. [place] [address] any warranty or liability for your use of this information. Vaccine Information Statement    Influenza (Flu) Vaccine (Inactivated or Recombinant): What You Need to Know    Many vaccine information statements are available in [place]ic and other languages. See www.immunize.org/vis. Hojas de información sobre vacunas están disponibles en español y en muchos otros idiomas. Visite www.immunize.org/vis. 1. Why get vaccinated? Influenza vaccine can prevent influenza (flu). Flu is a contagious disease that spreads around the [place] every year, usually between October and May. Anyone can get the flu, but it is more dangerous for some people. Infants and young children, people 72 years and older, pregnant people, and people with certain health conditions or a weakened immune system are at greatest risk of flu complications. Pneumonia, bronchitis, sinus infections, and ear infections are examples of flu-related complications. If you have a medical condition, such as heart disease, cancer, or diabetes, flu can make it worse.     Flu can cause fever and chills, sore throat, muscle aches, fatigue, cough, headache, and runny or stuffy nose. Some people may have vomiting and diarrhea, though this is more common in children than adults. In an average year, thousands of people in the [facility] die from flu, and many more are hospitalized. Flu vaccine prevents millions of illnesses and flu-related visits to the doctor each year. 2. Influenza vaccines     CDC recommends everyone 6 months and older get vaccinated every flu season. Children 6 months through 6years of age may need 2 doses during a single flu season. Everyone else needs only 1 dose each flu season. It takes about 2 weeks for protection to develop after vaccination. There are many flu viruses, and they are always changing. Each year a new flu vaccine is made to protect against the influenza viruses believed to be likely to cause disease in the upcoming flu season. Even when the vaccine doesnt exactly match these viruses, it may still provide some protection. Influenza vaccine does not cause flu. Influenza vaccine may be given at the same time as other vaccines. 3. Talk with your health care provider    Tell your vaccination provider if the person getting the vaccine:   Has had an allergic reaction after a previous dose of influenza vaccine, or has any severe, life-threatening allergies    Has ever had Guillain-Barré Syndrome (also called GBS)    In some cases, your health care provider may decide to postpone influenza vaccination until a future visit. Influenza vaccine can be administered at any time during pregnancy. People who are or will be pregnant during influenza season should receive inactivated influenza vaccine. People with minor illnesses, such as a cold, may be vaccinated. People who are moderately or severely ill should usually wait until they recover before getting influenza vaccine. Your health care provider can give you more information.     4. Risks of a vaccine reaction     Soreness, redness, and swelling where the shot is given, fever, muscle aches, and headache can happen after influenza vaccination.  There may be a very small increased risk of Guillain-Barré Syndrome (GBS) after inactivated influenza vaccine (the flu shot). [employer] children who get the flu shot along with pneumococcal vaccine (PCV13) and/or DTaP vaccine at the same time might be slightly more likely to have a seizure caused by fever. Tell your health care provider if a child who is getting flu vaccine has ever had a seizure. People sometimes faint after medical procedures, including vaccination. Tell your provider if you feel dizzy or have vision changes or ringing in the ears. As with any medicine, there is a very remote chance of a vaccine causing a severe allergic reaction, other serious injury, or death. 5. What if there is a serious problem? An allergic reaction could occur after the vaccinated person leaves the clinic. If you see signs of a severe allergic reaction (hives, swelling of the face and throat, difficulty breathing, a fast heartbeat, dizziness, or weakness), call 9-1-1 and get the person to the nearest hospital.    For other signs that concern you, call your health care provider. Adverse reactions should be reported to the Vaccine Adverse Event Reporting System (VAERS). Your health care provider will usually file this report, or you can do it yourself. Visit the VAERS website at www.vaers. hhs.gov or call 7-588.815.4214. VAERS is only for reporting reactions, and VAERS staff members do not give medical advice. 6. The National Vaccine Injury Compensation Program    The [facility] Vaccine Injury Compensation Program (VICP) is a federal program that was created to compensate people who may have been injured by certain vaccines. Claims regarding alleged injury or death due to vaccination have a time limit for filing, which may be as short as two years.  Visit the VICP website at www.[facility].gov/vaccinecompensation or call 6-988.627.2890 to learn about the program and about filing a claim. 7. How can I learn more?  Ask your health care provider.  Call your local or state health department.  Visit the website of the Food and Drug Administration (FDA) for vaccine package inserts and additional information at www.fda.gov/vaccines-blood-biologics/vaccines.  Contact the Centers for Disease Control and Prevention (CDC):  - Call 4-421.941.3420 (1-800-CDC-INFO) or  - Visit CDCs influenza website at www.cdc.gov/flu. Vaccine Information Statement   Inactivated Influenza Vaccine   8/6/2021  [address] [place] [employer] [id number]   Department of Health and Human Services  Centers for Disease Control and Prevention    Office Use Only

## 2021-10-29 NOTE — PROGRESS NOTES
Subjective:     Mrs. Quoc Cheema returns to the office today in general medical follow-up. She has hypercholesterolemia currently on statin therapy. Denies muscle soreness or GI upset. Has no history of coronary artery disease and denies exertional chest pains or claudication. She has a history of depression and has been on Wellbutrin over the last year. Her gynecologist initially prescribed the medication. She feels that the medication is not working effectively for her as she is having breakthrough symptoms. She has tolerated the medication without side effect. Past Medical History:   Diagnosis Date    Acute bacterial pharyngitis 8/8/2017    Cellulitis 8/8/2017    Exposure to influenza 8/8/2017    Hypercholesterolemia 8/8/2017    Low back pain 8/8/2017    Menopause 8/8/2017    TMJ dysfunction 8/9/2017     Past Surgical History:   Procedure Laterality Date    HX GYN      2 c sections 2002 & 2006     No Known Allergies  Current Outpatient Medications   Medication Sig Dispense Refill    buPROPion XL (WELLBUTRIN XL) 300 mg XL tablet Take 1 Tablet by mouth daily. 90 Tablet 1    rosuvastatin (CRESTOR) 10 mg tablet TAKE 1 TABLET BY MOUTH EVERY DAY 30 Tablet 3     Social History     Socioeconomic History    Marital status:      Spouse name: Not on file    Number of children: 2    Years of education: Not on file    Highest education level: Not on file   Tobacco Use    Smoking status: Never Smoker    Smokeless tobacco: Never Used   Vaping Use    Vaping Use: Never used   Substance and Sexual Activity    Alcohol use:  Yes     Alcohol/week: 14.0 standard drinks     Types: 14 Glasses of wine per week    Drug use: No     Social Determinants of Health     Financial Resource Strain:     Difficulty of Paying Living Expenses:    Food Insecurity:     Worried About Running Out of Food in the Last Year:     [address] in the Last Year:    Transportation Needs:     Lack of Transportation (Medical):  Lack of Transportation (Non-Medical):    Physical Activity:     Days of Exercise per Week:     Minutes of Exercise per Session:    Stress:     Feeling of Stress :    Social Connections:     Frequency of Communication with Friends and Family:     Frequency of Social Gatherings with Friends and Family:     Attends [employer] Services:     Active Member of Clubs or Organizations:     Attends Club or Organization Meetings:     Marital Status:      Family History   Problem Relation Age of Onset    Cancer Mother         breast and bladder     Heart Disease Father        Review of Systems:  GEN: no weight loss, weight gain, fatigue or night sweats  CV: no PND, orthopnea, or palpitations  Resp: no dyspnea on exertion, no cough  Abd: no nausea, vomiting or diarrhea  EXT: denies edema, claudication  Endocrine: no hair loss, excessive thirst or polyuria  Neurological ROS: no TIA or stroke symptoms  ROS otherwise negative      Objective:     Visit Vitals  /76 (BP 1 Location: Left upper arm, BP Patient Position: Sitting, BP Cuff Size: Adult)   Pulse 77   Temp 98.2 °F (36.8 °C) (Oral)   Resp 20   Ht 5' 4\" (1.626 m)   Wt 179 lb 9.6 oz (81.5 kg)   SpO2 97%   BMI 30.83 kg/m²     Body mass index is 30.83 kg/m². General:   alert, cooperative and no distress   Eyes: conjunctivae/sclerae clear. PERRL, EOM's intact   Mouth:  No oral lesions, no pharyngeal erythema, no exudates   Neck: Trachea midline, no thyromegaly, no bruits   Heart: S1 and S2 normal,no murmurs noted    Lungs: Clear to auscultation bilaterally, no increased work of breathing   Abdomen: Soft, nontender.   Normal bowel sounds   Extremities: No edema or cyanosis   Neuro: ..alert, oriented x3,speech normal in context and clarity, cranial nerves II-XII intact,motor strength: full proximally and distally,gait: normal  reflexes: full and symmetric     Physical exam otherwise negative         Assessment/Plan:     Diagnoses and all orders for this visit:    Hypercholesterolemia  -     COLLECTION VENOUS BLOOD,VENIPUNCTURE  -     CBC WITH AUTOMATED DIFF; Future  -     LIPID PANEL; Future  -     METABOLIC PANEL, COMPREHENSIVE; Future  -     TSH 3RD GENERATION; Future    Long-term use of high-risk medication  -     CK; Future    Depression, unspecified depression type    Needs flu shot  -     INFLUENZA VIRUS VAC QUAD,SPLIT,PRESV FREE SYRINGE IM    Other orders  -     buPROPion XL (WELLBUTRIN XL) 300 mg XL tablet; Take 1 Tablet by mouth daily. , Normal, Disp-90 Tablet, R-1        Other instructions: The patient's medications were reviewed and reconciled. She notes an increase in depressive symptomatology and would like to increase her Wellbutrin which we will increase to 300 mg daily. A no added salt, prudent diet is encouraged    Weight loss recommended with body mass index of 30.8    Influenza vaccination will be administered today    Await results of multiple labs    Follow-up yearly    Follow-up and Dispositions    · Return in about 1 year (around 10/29/2022). Gonzalez Page MD    Please note that this dictation was completed with [employer], the computer voice recognition software. Quite often unanticipated grammatical, syntax, homophones, and other interpretive errors are inadvertently transcribed by the computer software. Please disregard these errors. Please excuse any errors that have escaped final proofreading.

## 2021-10-29 NOTE — PROGRESS NOTES
Lindsay Bonilla is a 60 y.o. female presenting for Follow Up Chronic Condition (6 mo fu)  . 1. Have you been to the ER, urgent care clinic since your last visit? Hospitalized since your last visit? No    2. Have you seen or consulted any other health care providers outside of the [address] [place] [address] since your last visit? Include any pap smears or colon screening. No    No flowsheet data found. No flowsheet data found. 3 most recent PHQ Screens 4/28/2021   Little interest or pleasure in doing things Not at all   Feeling down, depressed, irritable, or hopeless Not at all   Total Score PHQ 2 0   Trouble falling or staying asleep, or sleeping too much Not at all   Feeling tired or having little energy Not at all   Poor appetite, weight loss, or overeating Not at all   Feeling bad about yourself - or that you are a failure or have let yourself or your family down Not at all   Trouble concentrating on things such as school, work, reading, or watching TV Not at all   Moving or speaking so slowly that other people could have noticed; or the opposite being so fidgety that others notice Not at all   Thoughts of being better off dead, or hurting yourself in some way Not at all   PHQ 9 Score 0   How difficult have these problems made it for you to do your work, take care of your home and get along with others Somewhat difficult       There are no discontinued medications. After obtaining written consent and per orders of Dr. Jaime Ureña, injection of Flulaval given by Joe Kerr CMA. Order and injection/medication verified by Dr David Whitfield. Patient tolerated procedure well. VIS was given to them. No reactions noted.

## 2021-10-30 LAB
ALBUMIN SERPL-MCNC: 4.5 G/DL (ref 3.8–4.9)
ALBUMIN/GLOB SERPL: 1.6 {RATIO} (ref 1.2–2.2)
ALP SERPL-CCNC: 100 IU/L (ref 44–121)
ALT SERPL-CCNC: 20 IU/L (ref 0–32)
AST SERPL-CCNC: 24 IU/L (ref 0–40)
BASOPHILS # BLD AUTO: 0 X10E3/UL (ref 0–0.2)
BASOPHILS NFR BLD AUTO: 1 %
BILIRUB SERPL-MCNC: 0.2 MG/DL (ref 0–1.2)
BUN SERPL-MCNC: 15 MG/DL (ref 6–24)
BUN/CREAT SERPL: 16 (ref 9–23)
CALCIUM SERPL-MCNC: 9.7 MG/DL (ref 8.7–10.2)
CHLORIDE SERPL-SCNC: 103 MMOL/L (ref 96–106)
CHOLEST SERPL-MCNC: 186 MG/DL (ref 100–199)
CK SERPL-CCNC: 125 U/L (ref 32–182)
CO2 SERPL-SCNC: 23 MMOL/L (ref 20–29)
CREAT SERPL-MCNC: 0.95 MG/DL (ref 0.57–1)
EOSINOPHIL # BLD AUTO: 0.1 X10E3/UL (ref 0–0.4)
EOSINOPHIL NFR BLD AUTO: 1 %
ERYTHROCYTE [DISTWIDTH] IN BLOOD BY AUTOMATED COUNT: 12.5 % (ref 11.7–15.4)
GLOBULIN SER CALC-MCNC: 2.9 G/DL (ref 1.5–4.5)
GLUCOSE SERPL-MCNC: 98 MG/DL (ref 65–99)
HCT VFR BLD AUTO: 46.6 % (ref 34–46.6)
HDLC SERPL-MCNC: 63 MG/DL
HGB BLD-MCNC: 15.4 G/DL (ref 11.1–15.9)
IMM GRANULOCYTES # BLD AUTO: 0.1 X10E3/UL (ref 0–0.1)
IMM GRANULOCYTES NFR BLD AUTO: 1 %
LDLC SERPL CALC-MCNC: 92 MG/DL (ref 0–99)
LYMPHOCYTES # BLD AUTO: 2.6 X10E3/UL (ref 0.7–3.1)
LYMPHOCYTES NFR BLD AUTO: 34 %
MCH RBC QN AUTO: 31 PG (ref 26.6–33)
MCHC RBC AUTO-ENTMCNC: 33 G/DL (ref 31.5–35.7)
MCV RBC AUTO: 94 FL (ref 79–97)
MONOCYTES # BLD AUTO: 0.6 X10E3/UL (ref 0.1–0.9)
MONOCYTES NFR BLD AUTO: 7 %
NEUTROPHILS # BLD AUTO: 4.4 X10E3/UL (ref 1.4–7)
NEUTROPHILS NFR BLD AUTO: 56 %
PLATELET # BLD AUTO: 312 X10E3/UL (ref 150–450)
POTASSIUM SERPL-SCNC: 4.5 MMOL/L (ref 3.5–5.2)
PROT SERPL-MCNC: 7.4 G/DL (ref 6–8.5)
RBC # BLD AUTO: 4.96 X10E6/UL (ref 3.77–5.28)
SODIUM SERPL-SCNC: 140 MMOL/L (ref 134–144)
TRIGL SERPL-MCNC: 183 MG/DL (ref 0–149)
TSH SERPL DL<=0.005 MIU/L-ACNC: 1.42 UIU/ML (ref 0.45–4.5)
VLDLC SERPL CALC-MCNC: 31 MG/DL (ref 5–40)
WBC # BLD AUTO: 7.8 X10E3/UL (ref 3.4–10.8)

## 2021-11-01 NOTE — PROGRESS NOTES
Called and spoke to patient  Two pt identifiers confirmed  Informed patient per Dr. Mikael Maria that labs are stable and no changes in medications are needed. Patient verbalized understanding of information discussed  with no further questions at this time.

## 2021-12-23 RX ORDER — ROSUVASTATIN CALCIUM 10 MG/1
TABLET, COATED ORAL
Qty: 90 TABLET | Refills: 1 | Status: SHIPPED | OUTPATIENT
Start: 2021-12-23 | End: 2022-06-27 | Stop reason: SDUPTHER

## 2022-03-18 PROBLEM — M26.609 TMJ DYSFUNCTION: Status: ACTIVE | Noted: 2017-08-09

## 2022-03-18 PROBLEM — Z79.899 LONG-TERM USE OF HIGH-RISK MEDICATION: Status: ACTIVE | Noted: 2021-04-28

## 2022-03-19 PROBLEM — F32.A DEPRESSION: Status: ACTIVE | Noted: 2020-10-21

## 2022-03-19 PROBLEM — E78.00 HYPERCHOLESTEROLEMIA: Status: ACTIVE | Noted: 2017-08-08

## 2022-03-19 PROBLEM — M77.01 MEDIAL EPICONDYLITIS OF RIGHT ELBOW: Status: ACTIVE | Noted: 2020-10-09

## 2022-06-27 NOTE — TELEPHONE ENCOUNTER
PCP: Lucia Mcrae MD    Last appt: 10/29/2021  Future Appointments   Date Time Provider nAna Enriquez   11/1/2022  9:00 AM Mortimer Clay, MD [facility]       Requested Prescriptions     Pending Prescriptions Disp Refills    rosuvastatin (CRESTOR) 10 mg tablet 90 Tablet 1     Sig: Take 1 Tablet by mouth daily.          Other Comments:

## 2022-06-27 NOTE — TELEPHONE ENCOUNTER
PCP: Jodee Luo MD    Last appt: 10/29/2021  Future Appointments   Date Time Provider Anna Enriquez   11/1/2022  9:00 AM Jacquelin Oseguera MD [facility]       Requested Prescriptions     Pending Prescriptions Disp Refills    rosuvastatin (CRESTOR) 10 mg tablet 90 Tablet 1     Sig: Take 1 Tablet by mouth daily.        Prior labs and Blood pressures:  BP Readings from Last 3 Encounters:   10/29/21 126/76   04/28/21 120/78   10/21/20 128/78     Lab Results   Component Value Date/Time    Sodium 140 10/29/2021 09:40 AM    Potassium 4.5 10/29/2021 09:40 AM    Chloride 103 10/29/2021 09:40 AM    CO2 23 10/29/2021 09:40 AM    Glucose 98 10/29/2021 09:40 AM    BUN 15 10/29/2021 09:40 AM    Creatinine 0.95 10/29/2021 09:40 AM    BUN/Creatinine ratio 16 10/29/2021 09:40 AM    GFR est AA 76 10/29/2021 09:40 AM    GFR est non-AA 66 10/29/2021 09:40 AM    Calcium 9.7 10/29/2021 09:40 AM     Lab Results   Component Value Date/Time    Hemoglobin A1c 5.7 (H) 09/12/2017 02:01 PM     Lab Results   Component Value Date/Time    Cholesterol, total 186 10/29/2021 09:40 AM    HDL Cholesterol 63 10/29/2021 09:40 AM    LDL, calculated 92 10/29/2021 09:40 AM    LDL, calculated 147 (H) 10/13/2017 08:49 AM    VLDL, calculated 31 10/29/2021 09:40 AM    VLDL, calculated 51 (H) 10/13/2017 08:49 AM    Triglyceride 183 (H) 10/29/2021 09:40 AM     No results found for: Alferd Spatz, [id number]    Lab Results   Component Value Date/Time    TSH 1.420 10/29/2021 09:40 AM

## 2022-06-28 RX ORDER — ROSUVASTATIN CALCIUM 10 MG/1
10 TABLET, COATED ORAL DAILY
Qty: 90 TABLET | Refills: 1 | Status: SHIPPED | OUTPATIENT
Start: 2022-06-28 | End: 2022-11-01

## 2022-07-08 ENCOUNTER — TRANSCRIBE ORDER (OUTPATIENT)
Dept: SCHEDULING | Age: 57
End: 2022-07-08

## 2022-07-08 DIAGNOSIS — Z80.3 FAMILY HISTORY OF MALIGNANT NEOPLASM OF BREAST: Primary | ICD-10-CM

## 2022-07-08 DIAGNOSIS — R91.1 SOLITARY PULMONARY NODULE: Primary | ICD-10-CM

## 2022-08-11 ENCOUNTER — TRANSCRIBE ORDER (OUTPATIENT)
Dept: SCHEDULING | Age: 57
End: 2022-08-11

## 2022-08-11 DIAGNOSIS — D58.2 OTHER HEMOGLOBINOPATHIES (HCC): Primary | ICD-10-CM

## 2022-08-11 DIAGNOSIS — E83.110 HEREDITARY HEMOCHROMATOSIS (HCC): ICD-10-CM

## 2022-08-29 ENCOUNTER — HOSPITAL ENCOUNTER (OUTPATIENT)
Dept: ULTRASOUND IMAGING | Age: 57
Discharge: HOME | End: 2022-08-29
Attending: INTERNAL MEDICINE
Payer: COMMERCIAL

## 2022-08-29 DIAGNOSIS — E83.110 HEREDITARY HEMOCHROMATOSIS (HCC): ICD-10-CM

## 2022-08-29 DIAGNOSIS — D58.2 OTHER HEMOGLOBINOPATHIES (HCC): ICD-10-CM

## 2022-08-29 PROCEDURE — 76700 US EXAM ABDOM COMPLETE: CPT

## 2022-09-14 ENCOUNTER — TELEPHONE (OUTPATIENT)
Dept: INTERNAL MEDICINE CLINIC | Age: 57
End: 2022-09-14

## 2022-09-14 NOTE — TELEPHONE ENCOUNTER
Patient called and stated that she began to have a headache and a scratchy throat yesterday. Stated she tested positive for covid yesterday and since her symptoms have increased. Stated she has a headache, sore throat, muscle aches, congestion, cough and faigue. Would like to know if there is something that can be called in for her to [address] [place] [address].

## 2022-09-15 DIAGNOSIS — U07.1 COVID-19: Primary | ICD-10-CM

## 2022-09-15 NOTE — PROGRESS NOTES
ICD-10-CM ICD-9-CM    1. COVID-19  U07.1 079.89 nirmatrelvir-ritonavir (Paxlovid, EUA,) 300 mg (150 mg x 2)-100 mg

## 2022-10-23 NOTE — PROGRESS NOTES
ICD-10-CM ICD-9-CM    1. Routine adult health maintenance  Z00.00 V70.0 HEMOGLOBIN A1C WITH EAG      MICROALBUMIN, UR, RAND W/ MICROALB/CREAT RATIO      MICROALBUMIN, UR, RAND W/ MICROALB/CREAT RATIO      HEMOGLOBIN A1C WITH EAG      2. Encounter for immunization  Z23 V03.89 INFLUENZA, FLUARIX, FLULAVAL, FLUZONE (AGE 6 MO+), AFLURIA(AGE 3Y+) IM, PF, 0.5 ML      3. Hypercholesterolemia  W92.92 661.7 METABOLIC PANEL, COMPREHENSIVE      LIPID PANEL      LIPID PANEL      METABOLIC PANEL, COMPREHENSIVE      4. Anxiety and depression  F41.9 300.00     F32. A 311       5. Carrier of hemochromatosis HFE gene mutation  Z14.8 V83.89 CBC WITH AUTOMATED DIFF      FERRITIN      FERRITIN      CBC WITH AUTOMATED DIFF               Subjective:     Chief Complaint   Patient presents with    Physical       Anais Reason is a 59 y.o. F.  She has a past medical history of hypercholesterolemia. I reviewed and updated the medical record. This patient was seen by her previous primary care provider most recently in October 2021. She was using rosuvastatin at the time, and felt to be tolerating it well. She was noted to have a history of depression, having been treated previously with Wellbutrin at the direction of gynecology. This was felt to be effective for her. Physical examination was only notable for obesity with a BMI of 30.8 kg/m². Her bupropion was increased to 300 mg daily given her ongoing depressive symptomatology. Secondary to the patient being a gene carrier for hereditary hemochromatosis, the patient had undergone right upper quadrant ultrasound in August, which had demonstrated parenchymal disease and heterogeneous appearance. Today, the patient comes in for routine physical examination. She reports feeling fine overall today and has no acute somatic complaints. However, she reports that she has discontinued all of her medications that were previously prescribed.   This includes the Contrave that her gynecologist had prescribed for weight loss, the bupropion that had previously been prescribed for depression, and the rosuvastatin had been prescribed previously for hypercholesterolemia. She says that she had not had any significant side effects associate with the medications, but said that after her bout of COVID in September, she had wanted to simplify her medication regimen and did not know if the medications were helping her. She denies having had any recent ongoing depression symptoms including any suicidal or homicidal ideation, and denies any other red flag symptoms associate with this even without the use of bupropion. She denies having had any chest pain, shortness of breath, or any further cardiopulmonary symptoms, and furthermore denies any personal history of cardiovascular or cerebrovascular disease. She notes that she has pending follow-up with gastroenterology and endocrinology for previous findings of fatty liver disease, with genetic testing performed by her gynecologist apparently having been positive for heterozygous HFE gene mutation. She denies having had any abdominal pain, skin color changes, stool color changes, or other problems. She otherwise continues to try to lose weight on her own without medications. She continues to be followed by her gynecologist and will be following up with them for routine mammography and breast MRI in the coming weeks; she also notes pending follow-up with her endocrinologist.  Her review of systems is otherwise negative. Routine Healthcare Maintenance issues are reviewed and discussed with the patient as noted below. Orders to update gaps in healthcare maintenance were placed as noted below in the Assessment and Plan, where applicable. 10-year Cardiovascular Risk David Chu, 2013):   The 10-year ASCVD risk score (Jese PARRY, et al., 2019) is: 2.2%    Values used to calculate the score:      Age: 59 years      Sex: Female      Is Non-  American: No      Diabetic: No      Tobacco smoker: No      Systolic Blood Pressure: 150 mmHg      Is BP treated: No      HDL Cholesterol: 63 mg/dL      Total Cholesterol: 186 mg/dL       Past Medical History:  Past Medical History:   Diagnosis Date    Carrier of hemochromatosis HFE gene mutation     * presumptive, per patient *    Depression     Hepatic steatosis 10/2019    CT Finding    History of acute pharyngitis 08/08/2017    Acute bacterial pharyngitis    History of back pain 08/08/2017    History of cellulitis 08/08/2017    Hypercholesterolemia 08/08/2017    Obesity     Personal history of COVID-19 09/2022    Postmenopausal 08/08/2017    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    Vitamin D deficiency        Past Surgical Histor:  Past Surgical History:   Procedure Laterality Date    HX GYN      2 c sections 2002 & 2006       Allergies:  No Known Allergies    Medications:  REM      Social History:  Social History     Socioeconomic History    Marital status:     Number of children: 2   Tobacco Use    Smoking status: Never    Smokeless tobacco: Never   Vaping Use    Vaping Use: Never used   Substance and Sexual Activity    Alcohol use:  Yes     Alcohol/week: 6.0 standard drinks     Types: 6 Glasses of wine per week    Drug use: No       Family History:  Family History   Problem Relation Age of Onset    Cancer Mother         breast and bladder     Heart Disease Father        Immunizations:  Immunization History   Administered Date(s) Administered    COVID-19, MODERNA BLUE border, Primary or Immunocompromised, (age 18y+), IM, 100 mcg/0.5mL 02/17/2021, 03/17/2021    COVID-19, MODERNA Booster BLUE border, (age 18y+), IM, 50mcg/0.25mL 01/04/2022    Influenza Vaccine 10/01/2008, 11/01/2018    Influenza, FLUARIX, FLULAVAL, FLUZONE (age 10 mo+) AND AFLURIA, (age 1 y+), PF, 0.5mL 10/17/2017, 10/09/2020, 10/29/2021, 11/01/2022    Influenza, FLUCELVAX, (age 10 mo+), MDCK, MDV 11/11/2019    Influenza, FLUCELVAX, (age 10 mo+), MDCK, PF 11/20/2018    Novel Influenza-H1N1-09, All Formulations 10/26/2009        Healthcare Maintenance:  Health Maintenance   Topic Date Due    DTaP/Tdap/Td series (1 - Tdap) Never done    Cervical cancer screen  Never done    Shingrix Vaccine Age 50> (1 of 2) Never done    COVID-19 Vaccine (4 - Booster for Moderna series) 03/01/2022    Depression Monitoring  11/01/2023    Breast Cancer Screen Mammogram  02/25/2024    Colorectal Cancer Screening Combo  04/30/2024    Lipid Screen  10/29/2026    Hepatitis C Screening  Completed    Flu Vaccine  Completed    Pneumococcal 0-64 years  Aged Out        Review of Systems:  ROS:  Review of Systems   Constitutional: Negative. HENT: Negative. Eyes: Negative. Respiratory: Negative. Cardiovascular: Negative. Gastrointestinal: Negative. Genitourinary: Negative. Musculoskeletal: Negative. Skin: Negative. Neurological: Negative. Endo/Heme/Allergies: Negative. Psychiatric/Behavioral: Negative. ROS otherwise negative      Objective:     Vital Signs:  Visit Vitals  /74 (BP 1 Location: Right arm, BP Patient Position: Sitting, BP Cuff Size: Large adult)   Pulse 76   Ht 5' 4\" (1.626 m)   Wt 171 lb 12.8 oz (77.9 kg)   SpO2 97%   BMI 29.49 kg/m²       BMI:  Body mass index is 29.49 kg/m². Physical Examination:  Physical Exam  Constitutional:       Appearance: Normal appearance. She is obese. HENT:      Head: Normocephalic and atraumatic. Right Ear: External ear normal.      Left Ear: External ear normal.      Nose: Nose normal.      Mouth/Throat:      Mouth: Mucous membranes are moist.      Pharynx: Oropharynx is clear. No oropharyngeal exudate or posterior oropharyngeal erythema. Cardiovascular:      Rate and Rhythm: Normal rate and regular rhythm. Pulses: Normal pulses. Heart sounds: Normal heart sounds. No murmur heard. No friction rub. No gallop.    Pulmonary:      Effort: Pulmonary effort is normal. No respiratory distress. Breath sounds: Normal breath sounds. No wheezing, rhonchi or rales. Abdominal:      General: Abdomen is flat. Bowel sounds are normal. There is no distension. Palpations: Abdomen is soft. Tenderness: There is no abdominal tenderness. There is no guarding. Musculoskeletal:         General: No swelling, tenderness or deformity. Normal range of motion. Cervical back: Normal range of motion and neck supple. No rigidity or tenderness. Skin:     General: Skin is warm and dry. Findings: No erythema, lesion or rash. Neurological:      General: No focal deficit present. Mental Status: She is alert and oriented to person, place, and time. Mental status is at baseline. Sensory: No sensory deficit. Motor: No weakness. Gait: Gait normal.      Deep Tendon Reflexes: Reflexes normal.   Psychiatric:         Mood and Affect: Mood normal.         Behavior: Behavior normal.         Judgment: Judgment normal.        Physical exam otherwise negative    Diagnostic Testing:    Laboratory Studies:  No visits with results within 6 Month(s) from this visit. Latest known visit with results is:   Office Visit on 10/29/2021   Component Date Value Ref Range Status    TSH 10/29/2021 1.420  0.450 - 4.500 uIU/mL Final    Glucose 10/29/2021 98  65 - 99 mg/dL Final    BUN 10/29/2021 15  6 - 24 mg/dL Final    Creatinine 10/29/2021 0.95  0.57 - 1.00 mg/dL Final    GFR est non-AA 10/29/2021 66  >59 mL/min/1.73 Final    GFR est AA 10/29/2021 76  >59 mL/min/1.73 Final    Comment: **In accordance with recommendations from the NKF-ASN Task force,**    [facility] is [facility] updating its eGFR calculation to the    2021 CKD-EPI creatinine equation that estimates kidney function    without a race variable.       BUN/Creatinine ratio 10/29/2021 16  9 - 23 Final    Sodium 10/29/2021 140  134 - 144 mmol/L Final    Potassium 10/29/2021 4.5  3.5 - 5.2 mmol/L Final    Chloride 10/29/2021 103  96 - 106 mmol/L Final    CO2 10/29/2021 23  20 - 29 mmol/L Final    Calcium 10/29/2021 9.7  8.7 - 10.2 mg/dL Final    Protein, total 10/29/2021 7.4  6.0 - 8.5 g/dL Final    Albumin 10/29/2021 4.5  3.8 - 4.9 g/dL Final    GLOBULIN, TOTAL 10/29/2021 2.9  1.5 - 4.5 g/dL Final    A-G Ratio 10/29/2021 1.6  1.2 - 2.2 Final    Bilirubin, total 10/29/2021 0.2  0.0 - 1.2 mg/dL Final    Alk. phosphatase 10/29/2021 100  44 - 121 IU/L Final                  **Please note reference interval change**    AST (SGOT) 10/29/2021 24  0 - 40 IU/L Final    ALT (SGPT) 10/29/2021 20  0 - 32 IU/L Final    Cholesterol, total 10/29/2021 186  100 - 199 mg/dL Final    Triglyceride 10/29/2021 183 (A)  0 - 149 mg/dL Final    HDL Cholesterol 10/29/2021 63  >39 mg/dL Final    VLDL, calculated 10/29/2021 31  5 - 40 mg/dL Final    LDL, calculated 10/29/2021 92  0 - 99 mg/dL Final    Creatine Kinase,Total 10/29/2021 125  32 - 182 U/L Final    WBC 10/29/2021 7.8  3.4 - 10.8 x10E3/uL Final    RBC 10/29/2021 4.96  3.77 - 5.28 x10E6/uL Final    HGB 10/29/2021 15.4  11.1 - 15.9 g/dL Final    HCT 10/29/2021 46.6  34.0 - 46.6 % Final    MCV 10/29/2021 94  79 - 97 fL Final    MCH 10/29/2021 31.0  26.6 - 33.0 pg Final    MCHC 10/29/2021 33.0  31.5 - 35.7 g/dL Final    RDW 10/29/2021 12.5  11.7 - 15.4 % Final    PLATELET 25/62/9809 219  150 - 450 x10E3/uL Final    NEUTROPHILS 10/29/2021 56  Not Estab. % Final    Lymphocytes 10/29/2021 34  Not Estab. % Final    MONOCYTES 10/29/2021 7  Not Estab. % Final    EOSINOPHILS 10/29/2021 1  Not Estab. % Final    BASOPHILS 10/29/2021 1  Not Estab. % Final    ABS. NEUTROPHILS 10/29/2021 4.4  1.4 - 7.0 x10E3/uL Final    Abs Lymphocytes 10/29/2021 2.6  0.7 - 3.1 x10E3/uL Final    ABS. MONOCYTES 10/29/2021 0.6  0.1 - 0.9 x10E3/uL Final    ABS. EOSINOPHILS 10/29/2021 0.1  0.0 - 0.4 x10E3/uL Final    ABS. BASOPHILS 10/29/2021 0.0  0.0 - 0.2 x10E3/uL Final    IMMATURE GRANULOCYTES 10/29/2021 1  Not Estab. % Final    ABS. IMM. GRANS. 10/29/2021 0.1  0.0 - 0.1 x10E3/uL Final         Radiographic Studies:  XR Results (most recent):  No results found for this or any previous visit. DEIDRE Results (most recent):  No results found for this or any previous visit. CT Results (most recent):  Results from Hospital Encounter encounter on 10/16/19    CT ABD PELV W CONT    Narrative  EXAM: CT ABD PELV W CONT    INDICATION: Right lower quadrant pain    COMPARISON: None    CONTRAST: 100 mL of Isovue-370. TECHNIQUE:  Following the uneventful intravenous administration of contrast, thin axial  images were obtained through the abdomen and pelvis. Coronal and sagittal  reconstructions were generated. Oral contrast was not administered. CT dose  reduction was achieved through use of a standardized protocol tailored for this  examination and automatic exposure control for dose modulation. FINDINGS:  LUNG BASES: Clear. INCIDENTALLY IMAGED HEART AND MEDIASTINUM: Unremarkable. LIVER: There is heterogeneous hepatic steatosis. GALLBLADDER: Unremarkable. SPLEEN: No mass. PANCREAS: No mass or ductal dilatation. ADRENALS: Unremarkable. KIDNEYS: No mass, calculus, or hydronephrosis. STOMACH: Unremarkable. SMALL BOWEL: No dilatation or wall thickening. COLON: No dilatation or wall thickening. APPENDIX: Surgically absent. PERITONEUM: No ascites or pneumoperitoneum. RETROPERITONEUM: No lymphadenopathy or aortic aneurysm. REPRODUCTIVE ORGANS: Normal.  URINARY BLADDER: No mass or calculus. BONES: No destructive bone lesion. ADDITIONAL COMMENTS: N/A    Impression  IMPRESSION:  Heterogeneous hepatic steatosis. No acute findings. DEXA Results (most recent):  No results found for this or any previous visit. MRI Results (most recent):  Results from Orders Only encounter on 08/26/22    MRI BREAST BI W WO CONT       Assessment/Plan:       ICD-10-CM ICD-9-CM    1.  Routine adult health maintenance  Z00.00 V70.0 HEMOGLOBIN A1C WITH EAG      MICROALBUMIN, UR, RAND W/ MICROALB/CREAT RATIO      MICROALBUMIN, UR, RAND W/ MICROALB/CREAT RATIO      HEMOGLOBIN A1C WITH EAG      2. Encounter for immunization  Z23 V03.89 INFLUENZA, FLUARIX, FLULAVAL, FLUZONE (AGE 6 MO+), AFLURIA(AGE 3Y+) IM, PF, 0.5 ML      3. Hypercholesterolemia  C94.48 396.8 METABOLIC PANEL, COMPREHENSIVE      LIPID PANEL      LIPID PANEL      METABOLIC PANEL, COMPREHENSIVE      4. Anxiety and depression  F41.9 300.00     F32. A 311       5. Carrier of hemochromatosis HFE gene mutation  Z14.8 V83.89 CBC WITH AUTOMATED DIFF      FERRITIN      FERRITIN      CBC WITH AUTOMATED DIFF               Healthcare Maintenance:  - Preventive measures are reviewed as per above  - Up to date on routine interventions except as noted above  - Orders placed to update gaps as noted  - Notes: fasting labs ordered; flu shot today    Hyperlipidemia/Dyslipidemia:   - Summary of Cardiovascular Risks and Goals:     LDL goal is under 100  hyperlipidemia  Sherman 10-year risk <10%   -   Lab Results   Component Value Date/Time    LDL, calculated 92 10/29/2021 09:40 AM    LDL, calculated 147 (H) 10/13/2017 08:49 AM    HDL Cholesterol 63 10/29/2021 09:40 AM       - Relevant Cholesterol Meds:  Key Antihyperlipidemia Meds       The patient is on no antihyperlipidemia meds. - Cholesterol at target: yes   - Does patient meet USPSTF and ACC/AHA indications for pharmacotherapy (e.g., statin): no   - GI symptoms with meds: NO   - Muscle aches with meds: NO   - Other Adverse effects with meds: NO   - Medication Plan:  awaiting repeat LDL   - Notes: repeat labs ordered; defer RX pending recalculation of ASCVD risk    Anxiety/Depression:   - Current PHQ: No data recorded    - Current RX:   Key Psychotherapeutic Meds       Patient is on no psychotherapeutic meds. Key Neurological Meds       The patient is on no neurologic meds. - Psychology/Psychiatry Co-managing?  No   - Asymptomatic, no meds currently, continue current management    SERNA/Fatty Liver Disease:   - Biopsy (date): n/a   - Symptoms (if any): none   - Medications (if any):     - FIB4 Score: ---   - Weight Loss indicated? YES   - Last Fibroscan (repeat annually): n/a   LFTs: reviewed as above in labs   - Follows with GI: NO   - Vaccinations:   HAV: There is no immunization history for the selected administration types on file for this patient. HBV: There is no immunization history for the selected administration types on file for this patient. There is currently no FDA approved medical treatment for fatty liver, NALFD or SERNA. The only medical treatments for SERNA are though clinical trials. I have reviewed the patient's medical history in detail and updated the computerized patient record. We had a prolonged discussion about these complex clinical issues and went over the various important aspects to consider. All questions were answered. Advised the patient to call back or return to office if symptoms do not improve, change in nature, or persist.     The patient was given an after visit summary or informed of [employer] [facility] which includes patient instructions, diagnoses, current medications, & vitals. she expressed understanding with the diagnosis and plan. Davi Jordan MD    Please note that this dictation was completed with [employer], the computer voice recognition software. Quite often unanticipated grammatical, syntax, homophones, and other interpretive errors are inadvertently transcribed by the computer software. Please disregard these errors. Please excuse any errors that have escaped final proofreading. Follow-up and Dispositions    Return in about 6 months (around 5/1/2023).

## 2022-11-01 ENCOUNTER — OFFICE VISIT (OUTPATIENT)
Dept: INTERNAL MEDICINE CLINIC | Age: 57
End: 2022-11-01
Payer: COMMERCIAL

## 2022-11-01 VITALS
SYSTOLIC BLOOD PRESSURE: 120 MMHG | BODY MASS INDEX: 29.33 KG/M2 | HEART RATE: 76 BPM | OXYGEN SATURATION: 97 % | WEIGHT: 171.8 LBS | DIASTOLIC BLOOD PRESSURE: 74 MMHG | HEIGHT: 64 IN

## 2022-11-01 DIAGNOSIS — Z23 ENCOUNTER FOR IMMUNIZATION: ICD-10-CM

## 2022-11-01 DIAGNOSIS — Z00.00 ROUTINE ADULT HEALTH MAINTENANCE: Primary | ICD-10-CM

## 2022-11-01 DIAGNOSIS — E78.00 HYPERCHOLESTEROLEMIA: ICD-10-CM

## 2022-11-01 DIAGNOSIS — Z14.8 CARRIER OF HEMOCHROMATOSIS HFE GENE MUTATION: ICD-10-CM

## 2022-11-01 DIAGNOSIS — F41.9 ANXIETY AND DEPRESSION: ICD-10-CM

## 2022-11-01 DIAGNOSIS — F32.A ANXIETY AND DEPRESSION: ICD-10-CM

## 2022-11-01 PROCEDURE — 90471 IMMUNIZATION ADMIN: CPT | Performed by: INTERNAL MEDICINE

## 2022-11-01 PROCEDURE — 99396 PREV VISIT EST AGE 40-64: CPT | Performed by: INTERNAL MEDICINE

## 2022-11-01 PROCEDURE — 90686 IIV4 VACC NO PRSV 0.5 ML IM: CPT | Performed by: INTERNAL MEDICINE

## 2022-11-01 RX ORDER — NALTREXONE HYDROCHLORIDE AND BUPROPION HYDROCHLORIDE 8; 90 MG/1; MG/1
TABLET, EXTENDED RELEASE ORAL
COMMUNITY
End: 2022-11-01

## 2022-11-01 NOTE — PROGRESS NOTES
After obtaining written consent and per orders of Dr. Nola Rivas, injection of Flulaval given by Cristino Medel CMA. Order and injection/medication verified by Mandy Taylor. Patient tolerated procedure well. VIS was given to them. No reactions noted.

## 2022-11-01 NOTE — PATIENT INSTRUCTIONS
Learning About the [address] [facility] Diet  What is the Mediterranean diet? The Mediterranean diet is a style of eating rather than a diet plan. It features foods eaten in [place], [place], [place] and [place], and other countries along the [facility]. It emphasizes eating foods like fish, fruits, vegetables, beans, high-fiber breads and whole grains, nuts, and olive oil. This style of eating includes limited red meat, cheese, and sweets. Why choose the Mediterranean diet? A Mediterranean-style diet may improve heart health. It contains more fat than other heart-healthy diets. But the fats are mainly from nuts, unsaturated oils (such as fish oils and olive oil), and certain nut or seed oils (such as canola, soybean, or flaxseed oil). These fats may help protect the heart and blood vessels. How can you get started on the Mediterranean diet? Here are some things you can do to switch to a more Mediterranean way of eating. What to eat  Eat a variety of fruits and vegetables each day, such as grapes, blueberries, tomatoes, broccoli, peppers, figs, olives, spinach, eggplant, beans, lentils, and chickpeas. Eat a variety of whole-grain foods each day, such as oats, brown rice, and whole wheat bread, pasta, and couscous. Eat fish at least 2 times a week. Try tuna, salmon, mackerel, lake trout, herring, or sardines. Eat moderate amounts of low-fat dairy products, such as milk, cheese, or yogurt. Eat moderate amounts of poultry and eggs. Choose healthy (unsaturated) fats, such as nuts, olive oil, and certain nut or seed oils like canola, soybean, and flaxseed. Limit unhealthy (saturated) fats, such as butter, palm oil, and coconut oil. And limit fats found in animal products, such as meat and dairy products made with whole milk. Try to eat red meat only a few times a month in very small amounts. Limit sweets and desserts to only a few times a week. This includes sugar-sweetened drinks like soda.   The Mediterranean diet may also include red wine with your meal--1 glass each day for women and up to 2 glasses a day for men. Tips for eating at home  Use herbs, spices, garlic, lemon zest, and citrus juice instead of salt to add flavor to foods. Add avocado slices to your sandwich instead of kyle. Have fish for lunch or dinner instead of red meat. Brush the fish with olive oil, and broil or grill it. Sprinkle your salad with seeds or nuts instead of cheese. Cook with olive or canola oil instead of butter or oils that are high in saturated fat. Switch from 2% milk or whole milk to 1% or fat-free milk. Dip raw vegetables in a vinaigrette dressing or hummus instead of dips made from mayonnaise or sour cream.  Have a piece of fruit for dessert instead of a piece of cake. Try baked apples, or have some dried fruit. Tips for eating out  Try broiled, grilled, baked, or poached fish instead of having it fried or breaded. Ask your  to have your meals prepared with olive oil instead of butter. Order dishes made with marinara sauce or sauces made from olive oil. Avoid sauces made from cream or mayonnaise. Choose whole-grain breads, whole wheat pasta and pizza crust, brown rice, beans, and lentils. Cut back on butter or margarine on bread. Instead, you can dip your bread in a small amount of olive oil. Ask for a side salad or grilled vegetables instead of french fries or chips. Where can you learn more? Go to http://www.jenkins.com/  Enter O407 in the search box to learn more about \"Learning About the Mediterranean Diet. \"  Current as of: May 9, 2022               Content Version: 13.4  © 0093-9949 [employer], [employer]orporated. Care instructions adapted under license by [employer] (which disclaims liability or warranty for this information).  If you have questions about a medical condition or this instruction, always ask your healthcare professional. Juan R Jimenez disclaims any warranty or liability for your use of this information.

## 2022-11-01 NOTE — PROGRESS NOTES
Chief Complaint   Patient presents with    Physical       1. \"Have you been to the ER, urgent care clinic since your last visit? Hospitalized since your last visit? \" No    2. \"Have you seen or consulted any other health care providers outside of the [address] [place] [address] since your last visit? \" No     3. For patients aged 39-70: Has the patient had a colonoscopy / FIT/ Cologuard? No      If the patient is female:    4. For patients aged 41-77: Has the patient had a mammogram within the past 2 years? No      5. For patients aged 21-65: Has the patient had a pap smear?  No

## 2022-11-02 LAB
ALBUMIN SERPL-MCNC: 4.8 G/DL (ref 3.8–4.9)
ALBUMIN/CREAT UR: 5 MG/G CREAT (ref 0–29)
ALBUMIN/GLOB SERPL: 2.1 {RATIO} (ref 1.2–2.2)
ALP SERPL-CCNC: 89 IU/L (ref 44–121)
ALT SERPL-CCNC: 16 IU/L (ref 0–32)
AST SERPL-CCNC: 22 IU/L (ref 0–40)
BASOPHILS # BLD AUTO: 0.1 X10E3/UL (ref 0–0.2)
BASOPHILS NFR BLD AUTO: 1 %
BILIRUB SERPL-MCNC: 0.3 MG/DL (ref 0–1.2)
BUN SERPL-MCNC: 15 MG/DL (ref 6–24)
BUN/CREAT SERPL: 16 (ref 9–23)
CALCIUM SERPL-MCNC: 9.6 MG/DL (ref 8.7–10.2)
CHLORIDE SERPL-SCNC: 104 MMOL/L (ref 96–106)
CHOLEST SERPL-MCNC: 242 MG/DL (ref 100–199)
CO2 SERPL-SCNC: 25 MMOL/L (ref 20–29)
CREAT SERPL-MCNC: 0.94 MG/DL (ref 0.57–1)
CREAT UR-MCNC: 110.6 MG/DL
EGFR: 70 ML/MIN/1.73
EOSINOPHIL # BLD AUTO: 0.1 X10E3/UL (ref 0–0.4)
EOSINOPHIL NFR BLD AUTO: 1 %
ERYTHROCYTE [DISTWIDTH] IN BLOOD BY AUTOMATED COUNT: 13.1 % (ref 11.7–15.4)
EST. AVERAGE GLUCOSE BLD GHB EST-MCNC: 123 MG/DL
FERRITIN SERPL-MCNC: 243 NG/ML (ref 15–150)
GLOBULIN SER CALC-MCNC: 2.3 G/DL (ref 1.5–4.5)
GLUCOSE SERPL-MCNC: 99 MG/DL (ref 70–99)
HBA1C MFR BLD: 5.9 % (ref 4.8–5.6)
HCT VFR BLD AUTO: 46 % (ref 34–46.6)
HDLC SERPL-MCNC: 65 MG/DL
HGB BLD-MCNC: 15.6 G/DL (ref 11.1–15.9)
IMM GRANULOCYTES # BLD AUTO: 0.1 X10E3/UL (ref 0–0.1)
IMM GRANULOCYTES NFR BLD AUTO: 1 %
LDLC SERPL CALC-MCNC: 156 MG/DL (ref 0–99)
LYMPHOCYTES # BLD AUTO: 2.7 X10E3/UL (ref 0.7–3.1)
LYMPHOCYTES NFR BLD AUTO: 39 %
MCH RBC QN AUTO: 31.5 PG (ref 26.6–33)
MCHC RBC AUTO-ENTMCNC: 33.9 G/DL (ref 31.5–35.7)
MCV RBC AUTO: 93 FL (ref 79–97)
MICROALBUMIN UR-MCNC: 5.7 UG/ML
MONOCYTES # BLD AUTO: 0.5 X10E3/UL (ref 0.1–0.9)
MONOCYTES NFR BLD AUTO: 8 %
NEUTROPHILS # BLD AUTO: 3.5 X10E3/UL (ref 1.4–7)
NEUTROPHILS NFR BLD AUTO: 50 %
PLATELET # BLD AUTO: 310 X10E3/UL (ref 150–450)
POTASSIUM SERPL-SCNC: 4.6 MMOL/L (ref 3.5–5.2)
PROT SERPL-MCNC: 7.1 G/DL (ref 6–8.5)
RBC # BLD AUTO: 4.95 X10E6/UL (ref 3.77–5.28)
SODIUM SERPL-SCNC: 140 MMOL/L (ref 134–144)
TRIGL SERPL-MCNC: 121 MG/DL (ref 0–149)
VLDLC SERPL CALC-MCNC: 21 MG/DL (ref 5–40)
WBC # BLD AUTO: 6.9 X10E3/UL (ref 3.4–10.8)

## 2022-11-02 NOTE — PROGRESS NOTES
10-year Cardiovascular Risk Chris Suh):   The 10-year ASCVD risk score (Jese PARRY, et al., 2019) is: 2.7%    Values used to calculate the score:      Age: 59 years      Sex: Female      Is Non- : No      Diabetic: No      Tobacco smoker: No      Systolic Blood Pressure: 996 mmHg      Is BP treated: No      HDL Cholesterol: 65 mg/dL      Total Cholesterol: 242 mg/dL

## 2022-11-02 NOTE — PROGRESS NOTES
Please call the patient regarding her diagnostic evaluation. Moderately elevated ferritin consistent with her history of heterozygous carrier status for HFE/hemochromatosis. Hypercholesterolemia, LDL above target, currently 156 mg/dL. Target less than 100 mg/dL. HDL protective. Prediabetes, stable overall, 5.9% A1c. Dietary modifications recommended. ASCVD risk by pooled cohort equation still low at 2.7%. Consider adding on statin medication, would probably start for ASCVD risk greater than 7.5%.

## 2022-11-28 ENCOUNTER — TELEPHONE (OUTPATIENT)
Dept: INTERNAL MEDICINE CLINIC | Age: 57
End: 2022-11-28

## 2022-11-28 DIAGNOSIS — R30.0 DYSURIA: Primary | ICD-10-CM

## 2022-11-28 RX ORDER — NITROFURANTOIN 25; 75 MG/1; MG/1
100 CAPSULE ORAL 2 TIMES DAILY
Qty: 10 CAPSULE | Refills: 0 | Status: SHIPPED | OUTPATIENT
Start: 2022-11-28

## 2022-11-28 NOTE — PROGRESS NOTES
ICD-10-CM ICD-9-CM    1. Dysuria  R30.0 788. 1 CULTURE, URINE      URINALYSIS W/ RFLX MICROSCOPIC      nitrofurantoin, macrocrystal-monohydrate, (MACROBID) 100 mg capsule

## 2022-11-28 NOTE — TELEPHONE ENCOUNTER
Patient called and stated that she feels she is having symptoms of a uti. Stated that she is having urgency and pain which has developed over the weekend. Please advise of next steps?

## 2022-12-09 LAB — SARS-COV-2: NOT DETECTED

## 2022-12-15 ENCOUNTER — OFFICE VISIT (OUTPATIENT)
Dept: INTERNAL MEDICINE CLINIC | Age: 57
End: 2022-12-15
Payer: COMMERCIAL

## 2022-12-15 VITALS
SYSTOLIC BLOOD PRESSURE: 120 MMHG | DIASTOLIC BLOOD PRESSURE: 78 MMHG | WEIGHT: 177.4 LBS | TEMPERATURE: 98.1 F | RESPIRATION RATE: 16 BRPM | HEART RATE: 82 BPM | BODY MASS INDEX: 30.29 KG/M2 | OXYGEN SATURATION: 97 % | HEIGHT: 64 IN

## 2022-12-15 DIAGNOSIS — J06.9 VIRAL UPPER RESPIRATORY ILLNESS: Primary | ICD-10-CM

## 2022-12-15 PROCEDURE — 99213 OFFICE O/P EST LOW 20 MIN: CPT | Performed by: INTERNAL MEDICINE

## 2022-12-15 RX ORDER — OXYMETAZOLINE HCL 0.05 %
2 SPRAY, NON-AEROSOL (ML) NASAL 2 TIMES DAILY
Qty: 1 EACH | Refills: 0 | Status: SHIPPED | OUTPATIENT
Start: 2022-12-15 | End: 2022-12-18

## 2022-12-15 RX ORDER — FLUTICASONE PROPIONATE 50 MCG
2 SPRAY, SUSPENSION (ML) NASAL DAILY
Qty: 1 EACH | Refills: 1 | Status: SHIPPED | OUTPATIENT
Start: 2022-12-15

## 2022-12-15 RX ORDER — SOD CHLOR,SOD BICARB/NETI POT
1 PACKET, WITH RINSE DEVICE NASAL 3 TIMES DAILY
Qty: 50 EACH | Refills: 1 | Status: SHIPPED | OUTPATIENT
Start: 2022-12-15

## 2022-12-15 RX ORDER — BENZONATATE 100 MG/1
100 CAPSULE ORAL
Qty: 21 CAPSULE | Refills: 0 | Status: SHIPPED | OUTPATIENT
Start: 2022-12-15 | End: 2022-12-22

## 2022-12-15 NOTE — PROGRESS NOTES
ICD-10-CM ICD-9-CM    1. Viral upper respiratory illness  J06.9 465.9 sod bicarb-sod chlor-neti pot (Ayr Saline Nasal Neti Rinse) pkdv      fluticasone propionate (FLONASE) 50 mcg/actuation nasal spray      oxymetazoline (Afrin, oxymetazoline,) 0.05 % nasal spray      benzonatate (TESSALON) 100 mg capsule               Subjective:     Chief Complaint   Patient presents with    Sinus Infection       Benja Oliveira is a 59 y.o. F.  She has no significant past medical history except for hepatic steatosis and depression. She comes in for an acute complaint today of an upper respiratory tract infection. She had come down with the flu a couple weeks ago, and has had a residual cough since that time. During this time, she has had nasal congestion and postnasal drip. Her cough has been nonproductive. She has had some sinus fullness and drainage during this time as well. She denies having had any overt fevers or chills. No shortness of breath. No hemoptysis. No changes in exercise tolerance overall. No wheezing. Review of systems otherwise negative. Routine Healthcare Maintenance issues are reviewed and discussed with the patient as noted below. Orders to update gaps in healthcare maintenance were placed as noted below in the Assessment and Plan, where applicable.      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      sod bicarb-sod chlor-neti pot (Ayr Saline Nasal Neti Rinse) pkdv 1 Each by sinus irrigation route three (3) times daily. 50 Each 1    fluticasone propionate (FLONASE) 50 mcg/actuation nasal spray 2 Sprays by Both Nostrils route daily. 1 Each 1    oxymetazoline (Afrin, oxymetazoline,) 0.05 % nasal spray 2 Sprays by Both Nostrils route two (2) times a day for 3 days. 1 Each 0    benzonatate (TESSALON) 100 mg capsule Take 1 Capsule by mouth three (3) times daily as needed for Cough for up to 7 days. 21 Capsule 0    nitrofurantoin, macrocrystal-monohydrate, (MACROBID) 100 mg capsule Take 1 Capsule by mouth two (2) times a day. (Patient not taking: Reported on 12/15/2022) 10 Capsule 0       Social History:  Social History     Socioeconomic History    Marital status:     Number of children: 2   Tobacco Use    Smoking status: Never    Smokeless tobacco: Never   Vaping Use    Vaping Use: Never used   Substance and Sexual Activity    Alcohol use:  Yes     Alcohol/week: 6.0 standard drinks     Types: 6 Glasses of wine per week    Drug use: No       Family History:  Family History   Problem Relation Age of Onset    Cancer Mother         breast and bladder     Heart Disease Father        Immunizations:  Immunization History   Administered Date(s) Administered    COVID-19, MODERNA Marquez border, Primary or Immunocompromised, (age 18y+), IM, 100 mcg/0.5mL 02/17/2021, 03/17/2021    COVID-19, MODERNA Booster BLUE border, (age 18y+), IM, 50mcg/0.25mL 01/04/2022    Influenza Vaccine 10/01/2008, 11/01/2018    Influenza, FLUARIX, FLULAVAL, FLUZONE (age 10 mo+) AND AFLURIA, (age 1 y+), PF, 0.5mL 10/17/2017, 10/09/2020, 10/29/2021, 11/01/2022    Influenza, FLUCELVAX, (age 10 mo+), MDCK, MDV 11/11/2019    Influenza, FLUCELVAX, (age 10 mo+), MDCK, PF 11/20/2018    Novel Influenza-H1N1-09, All Formulations 10/26/2009        Healthcare Maintenance:  Health Maintenance   Topic Date Due    DTaP/Tdap/Td series (1 - Tdap) Never done    Cervical cancer screen  Never done    Shingrix Vaccine Age 50> (1 of 2) Never done    COVID-19 Vaccine (4 - Booster for Moderna series) 03/01/2022    Depression Monitoring  11/01/2023    A1C test (Diabetic or Prediabetic)  11/01/2023    Breast Cancer Screen Mammogram  02/25/2024    Colorectal Cancer Screening Combo  04/30/2024    Lipid Screen  11/01/2027    Hepatitis C Screening  Completed    Flu Vaccine  Completed    Pneumococcal 0-64 years  Aged Out        Review of Systems:  ROS:  Review of Systems   Constitutional:  Positive for malaise/fatigue. HENT:  Positive for congestion. Eyes: Negative. Respiratory:  Positive for cough. Cardiovascular: Negative. Gastrointestinal: Negative. Genitourinary: Negative. Musculoskeletal: Negative. Skin: Negative. Neurological: Negative. Endo/Heme/Allergies: Negative. Psychiatric/Behavioral: Negative. ROS otherwise negative      Objective:     Vital Signs:  Visit Vitals  /78 (BP 1 Location: Left upper arm, BP Patient Position: Sitting, BP Cuff Size: Large adult)   Pulse 82   Temp 98.1 °F (36.7 °C) (Oral)   Resp 16   Ht 5' 4\" (1.626 m)   Wt 177 lb 6.4 oz (80.5 kg)   SpO2 97%   BMI 30.45 kg/m²       BMI:  Body mass index is 30.45 kg/m². Physical Examination:  Physical Exam  Constitutional:       Appearance: Normal appearance. She is normal weight. HENT:      Head: Normocephalic and atraumatic. Right Ear: External ear normal.      Left Ear: External ear normal.      Nose: Congestion and rhinorrhea present. Mouth/Throat:      Mouth: Mucous membranes are moist.      Pharynx: Oropharynx is clear. No oropharyngeal exudate or posterior oropharyngeal erythema. Cardiovascular:      Rate and Rhythm: Normal rate and regular rhythm.       Pulses: Normal pulses. Heart sounds: Normal heart sounds. No murmur heard. No friction rub. No gallop. Pulmonary:      Effort: Pulmonary effort is normal. No respiratory distress. Breath sounds: Normal breath sounds. No wheezing, rhonchi or rales. Abdominal:      General: Abdomen is flat. Bowel sounds are normal. There is no distension. Palpations: Abdomen is soft. Tenderness: There is no abdominal tenderness. There is no guarding. Musculoskeletal:         General: No swelling, tenderness or deformity. Normal range of motion. Cervical back: Normal range of motion and neck supple. No rigidity or tenderness. Skin:     General: Skin is warm and dry. Findings: No erythema, lesion or rash. Neurological:      General: No focal deficit present. Mental Status: She is alert and oriented to person, place, and time. Mental status is at baseline. Sensory: No sensory deficit. Motor: No weakness.       Gait: Gait normal.      Deep Tendon Reflexes: Reflexes normal.   Psychiatric:         Mood and Affect: Mood normal.         Behavior: Behavior normal.         Judgment: Judgment normal.        Physical exam otherwise negative    Diagnostic Testing:    Laboratory Studies:  Appointment on 11/28/2022   Component Date Value Ref Range Status    Specific Gravity 11/28/2022      <=1.005 (A)  1.005 - 1.030 Final    pH (UA) 11/28/2022 7.0  5.0 - 7.5 Final    Color 11/28/2022 Yellow  Yellow Final    Appearance 11/28/2022 Clear  Clear Final    Leukocyte Esterase 11/28/2022 2+ (A)  Negative Final    Protein 11/28/2022 Negative  Negative/Trace Final    Glucose 11/28/2022 Negative  Negative Final    Ketone 11/28/2022 Negative  Negative Final    Blood 11/28/2022 Negative  Negative Final    Bilirubin 11/28/2022 Negative  Negative Final    Urobilinogen 11/28/2022 0.2  0.2 - 1.0 mg/dL Final    Nitrites 11/28/2022 Negative  Negative Final    Microscopic Examination 11/28/2022 See additional order Final    Microscopic was indicated and was performed.     Urine Culture, Routine 11/28/2022  (A)   Final                    Value:Escherichia coli  50,000-100,000 colony forming units per mL      WBC 11/28/2022 6-10 (A)  0 - 5 /hpf Final    RBC 11/28/2022 None seen  0 - 2 /hpf Final    Epithelial cells 11/28/2022 None seen  0 - 10 /hpf Final    Casts 11/28/2022 None seen  None seen /lpf Final    Bacteria 11/28/2022 Many (A)  None seen/Few Final   Office Visit on 11/01/2022   Component Date Value Ref Range Status    Ferritin 11/01/2022 243 (A)  15 - 150 ng/mL Final    Creatinine, urine random 11/01/2022 110.6  Not Estab. mg/dL Final    Microalbumin, urine 11/01/2022 5.7  Not Estab. ug/mL Final    Microalb/Creat ratio (ug/mg creat.) 11/01/2022 5  0 - 29 mg/g creat Final    Comment:                        Normal:                0 -  29                         Moderately increased: 30 - 300                         Severely increased:       >300      Cholesterol, total 11/01/2022 242 (A)  100 - 199 mg/dL Final    Triglyceride 11/01/2022 121  0 - 149 mg/dL Final    HDL Cholesterol 11/01/2022 65  >39 mg/dL Final    VLDL, calculated 11/01/2022 21  5 - 40 mg/dL Final    LDL, calculated 11/01/2022 156 (A)  0 - 99 mg/dL Final    Glucose 11/01/2022 99  70 - 99 mg/dL Final    BUN 11/01/2022 15  6 - 24 mg/dL Final    Creatinine 11/01/2022 0.94  0.57 - 1.00 mg/dL Final    eGFR 11/01/2022 70  >59 mL/min/1.73 Final    BUN/Creatinine ratio 11/01/2022 16  9 - 23 Final    Sodium 11/01/2022 140  134 - 144 mmol/L Final    Potassium 11/01/2022 4.6  3.5 - 5.2 mmol/L Final    Chloride 11/01/2022 104  96 - 106 mmol/L Final    CO2 11/01/2022 25  20 - 29 mmol/L Final    Calcium 11/01/2022 9.6  8.7 - 10.2 mg/dL Final    Protein, total 11/01/2022 7.1  6.0 - 8.5 g/dL Final    Albumin 11/01/2022 4.8  3.8 - 4.9 g/dL Final    GLOBULIN, TOTAL 11/01/2022 2.3  1.5 - 4.5 g/dL Final    A-G Ratio 11/01/2022 2.1  1.2 - 2.2 Final    Bilirubin, total 11/01/2022 0.3  0.0 - 1.2 mg/dL Final    Alk. phosphatase 11/01/2022 89  44 - 121 IU/L Final    AST (SGOT) 11/01/2022 22  0 - 40 IU/L Final    ALT (SGPT) 11/01/2022 16  0 - 32 IU/L Final    WBC 11/01/2022 6.9  3.4 - 10.8 x10E3/uL Final    RBC 11/01/2022 4.95  3.77 - 5.28 x10E6/uL Final    HGB 11/01/2022 15.6  11.1 - 15.9 g/dL Final    HCT 11/01/2022 46.0  34.0 - 46.6 % Final    MCV 11/01/2022 93  79 - 97 fL Final    MCH 11/01/2022 31.5  26.6 - 33.0 pg Final    MCHC 11/01/2022 33.9  31.5 - 35.7 g/dL Final    RDW 11/01/2022 13.1  11.7 - 15.4 % Final    PLATELET 29/65/9983 553  150 - 450 x10E3/uL Final    NEUTROPHILS 11/01/2022 50  Not Estab. % Final    Lymphocytes 11/01/2022 39  Not Estab. % Final    MONOCYTES 11/01/2022 8  Not Estab. % Final    EOSINOPHILS 11/01/2022 1  Not Estab. % Final    BASOPHILS 11/01/2022 1  Not Estab. % Final    ABS. NEUTROPHILS 11/01/2022 3.5  1.4 - 7.0 x10E3/uL Final    Abs Lymphocytes 11/01/2022 2.7  0.7 - 3.1 x10E3/uL Final    ABS. MONOCYTES 11/01/2022 0.5  0.1 - 0.9 x10E3/uL Final    ABS. EOSINOPHILS 11/01/2022 0.1  0.0 - 0.4 x10E3/uL Final    ABS. BASOPHILS 11/01/2022 0.1  0.0 - 0.2 x10E3/uL Final    IMMATURE GRANULOCYTES 11/01/2022 1  Not Estab. % Final    ABS. IMM. GRANS. 11/01/2022 0.1  0.0 - 0.1 x10E3/uL Final    Hemoglobin A1c 11/01/2022 5.9 (A)  4.8 - 5.6 % Final    Comment:          Prediabetes: 5.7 - 6.4           Diabetes: >6.4           Glycemic control for adults with diabetes: <7.0      Estimated average glucose 11/01/2022 123  mg/dL Final         Radiographic Studies:  XR Results (most recent):  No results found for this or any previous visit. DEIDRE Results (most recent):  No results found for this or any previous visit.      CT Results (most recent):  Results from Hospital Encounter encounter on 10/16/19    CT ABD PELV W CONT    Narrative  EXAM: CT ABD PELV W CONT    INDICATION: Right lower quadrant pain    COMPARISON: None    CONTRAST: 100 mL of Isovue-370. TECHNIQUE:  Following the uneventful intravenous administration of contrast, thin axial  images were obtained through the abdomen and pelvis. Coronal and sagittal  reconstructions were generated. Oral contrast was not administered. CT dose  reduction was achieved through use of a standardized protocol tailored for this  examination and automatic exposure control for dose modulation. FINDINGS:  LUNG BASES: Clear. INCIDENTALLY IMAGED HEART AND MEDIASTINUM: Unremarkable. LIVER: There is heterogeneous hepatic steatosis. GALLBLADDER: Unremarkable. SPLEEN: No mass. PANCREAS: No mass or ductal dilatation. ADRENALS: Unremarkable. KIDNEYS: No mass, calculus, or hydronephrosis. STOMACH: Unremarkable. SMALL BOWEL: No dilatation or wall thickening. COLON: No dilatation or wall thickening. APPENDIX: Surgically absent. PERITONEUM: No ascites or pneumoperitoneum. RETROPERITONEUM: No lymphadenopathy or aortic aneurysm. REPRODUCTIVE ORGANS: Normal.  URINARY BLADDER: No mass or calculus. BONES: No destructive bone lesion. ADDITIONAL COMMENTS: N/A    Impression  IMPRESSION:  Heterogeneous hepatic steatosis. No acute findings. DEXA Results (most recent):  No results found for this or any previous visit.      MRI Results (most recent):  Results from Orders Only encounter on 08/26/22    MRI BREAST BI W WO CONT       Assessment/Plan:       ICD-10-CM ICD-9-CM    1. Viral upper respiratory illness  J06.9 465.9 sod bicarb-sod chlor-neti pot (Ayr Saline Nasal Neti Rinse) pkdv      fluticasone propionate (FLONASE) 50 mcg/actuation nasal spray      oxymetazoline (Afrin, oxymetazoline,) 0.05 % nasal spray      benzonatate (TESSALON) 100 mg capsule             Upper Respiratory Tract Infection:   - Suspect likely viral upper respiratory tract infection   - No evidence to suggest pneumonia or acute bacterial sinusitis at this time   - Counseled aggressive conservative management with nasal saline rinses and nasal fluticasone   - Trial of nasal decongestants: YES   - Advised that should symptoms not improve after 5-7 days of therapy that additional treatment with antibiotics such as Augmentin or Doxycycline depending on allergies might be reasonable   - Return precautions provided should symptoms fail to respond or worsen   - Patient endorses agreement and understanding   - Notes: ---              I have reviewed the patient's medical history in detail and updated the computerized patient record. We had a prolonged discussion about these complex clinical issues and went over the various important aspects to consider. All questions were answered. Advised the patient to call back or return to office if symptoms do not improve, change in nature, or persist.     The patient was given an after visit summary or informed of eSoft Access which includes patient instructions, diagnoses, current medications, & vitals. she expressed understanding with the diagnosis and plan. Loraine Jaimes MD    Please note that this dictation was completed with [employer], the computer voice recognition software. Quite often unanticipated grammatical, syntax, homophones, and other interpretive errors are inadvertently transcribed by the computer software. Please disregard these errors. Please excuse any errors that have escaped final proofreading.

## 2022-12-15 NOTE — PROGRESS NOTES
Chief Complaint   Patient presents with    Sinus Infection   Visit Vitals  /78 (BP 1 Location: Left upper arm, BP Patient Position: Sitting, BP Cuff Size: Large adult)   Pulse 82   Temp 98.1 °F (36.7 °C) (Oral)   Resp 16   Ht 5' 4\" (1.626 m)   Wt 177 lb 6.4 oz (80.5 kg)   SpO2 97%   BMI 30.45 kg/m²         1. \"Have you been to the ER, urgent care clinic since your last visit? Hospitalized since your last visit? \"  [facility] 12/7[facility] for cold like symptoms     2. \"Have you seen or consulted any other health care providers outside of the [address] [place] [address] since your last visit? \" No     3. For patients aged 39-70: Has the patient had a colonoscopy / FIT/ Cologuard? No      If the patient is female:    4. For patients aged 41-77: Has the patient had a mammogram within the past 2 years? NA - based on age or sex      11. For patients aged 21-65: Has the patient had a pap smear?  No

## 2022-12-22 ENCOUNTER — TELEPHONE (OUTPATIENT)
Dept: HEMATOLOGY | Age: 57
End: 2022-12-22

## 2023-01-03 DIAGNOSIS — J06.9 VIRAL UPPER RESPIRATORY ILLNESS: ICD-10-CM

## 2023-01-03 RX ORDER — FLUTICASONE PROPIONATE 50 MCG
2 SPRAY, SUSPENSION (ML) NASAL DAILY
Qty: 1 EACH | Refills: 1 | Status: SHIPPED | OUTPATIENT
Start: 2023-01-03

## 2023-01-03 NOTE — TELEPHONE ENCOUNTER
PCP: Rommel Saldana MD    Last appt: 12/15/2022  Future Appointments   Date Time Provider Anna Enriquez   2023  9:00 AM Rommel Saldana MD [facility] AMB   2023  8:30 AM Evelyn Sow, NP [facility] AMB       Requested Prescriptions     Pending Prescriptions Disp Refills    fluticasone propionate (FLONASE) 50 mcg/actuation nasal spray 1 Each 1     Si Sprays by Both Nostrils route daily.        Prior labs and Blood pressures:  BP Readings from Last 3 Encounters:   12/15/22 120/78   22 120/74   10/29/21 126/76     Lab Results   Component Value Date/Time    Sodium 140 2022 09:58 AM    Potassium 4.6 2022 09:58 AM    Chloride 104 2022 09:58 AM    CO2 25 2022 09:58 AM    Glucose 99 2022 09:58 AM    BUN 15 2022 09:58 AM    Creatinine 0.94 2022 09:58 AM    BUN/Creatinine ratio 16 2022 09:58 AM    GFR est AA 76 10/29/2021 09:40 AM    GFR est non-AA 66 10/29/2021 09:40 AM    Calcium 9.6 2022 09:58 AM     Lab Results   Component Value Date/Time    Hemoglobin A1c 5.9 (H) 2022 09:58 AM     Lab Results   Component Value Date/Time    Cholesterol, total 242 (H) 2022 09:58 AM    HDL Cholesterol 65 2022 09:58 AM    LDL, calculated 156 (H) 2022 09:58 AM    LDL, calculated 147 (H) 10/13/2017 08:49 AM    VLDL, calculated 21 2022 09:58 AM    VLDL, calculated 51 (H) 10/13/2017 08:49 AM    Triglyceride 121 2022 09:58 AM     No results found for: EVETTE Vaughan    Lab Results   Component Value Date/Time    TSH 1.420 10/29/2021 09:40 AM

## 2023-01-03 NOTE — TELEPHONE ENCOUNTER
PCP: Emre Laguna MD    Last appt: 12/15/2022  Future Appointments   Date Time Provider Anna Enriquez   2023  9:00 AM Emre Laguna MD [facility] AM[facility]   2023  8:30 AM Evelyn Sow, NP [facility] AMB       Requested Prescriptions     Pending Prescriptions Disp Refills    fluticasone propionate (FLONASE) 50 mcg/actuation nasal spray 1 Each 1     Si Sprays by Both Nostrils route daily.          Other Comments:

## 2023-02-23 RX ORDER — ROSUVASTATIN CALCIUM 10 MG/1
TABLET, COATED ORAL
Qty: 90 TABLET | Refills: 1 | OUTPATIENT
Start: 2023-02-23

## 2023-02-23 RX ORDER — ROSUVASTATIN CALCIUM 10 MG/1
10 TABLET, COATED ORAL
Qty: 90 TABLET | Refills: 3 | Status: SHIPPED | OUTPATIENT
Start: 2023-02-23

## 2023-04-21 DIAGNOSIS — R91.1 SOLITARY PULMONARY NODULE: Primary | ICD-10-CM

## 2023-05-02 ENCOUNTER — OFFICE VISIT (OUTPATIENT)
Dept: INTERNAL MEDICINE CLINIC | Age: 58
End: 2023-05-02
Payer: COMMERCIAL

## 2023-05-02 VITALS
WEIGHT: 183.4 LBS | HEART RATE: 78 BPM | SYSTOLIC BLOOD PRESSURE: 124 MMHG | DIASTOLIC BLOOD PRESSURE: 82 MMHG | HEIGHT: 64 IN | RESPIRATION RATE: 16 BRPM | OXYGEN SATURATION: 97 % | BODY MASS INDEX: 31.31 KG/M2

## 2023-05-02 DIAGNOSIS — E78.00 HYPERCHOLESTEROLEMIA: ICD-10-CM

## 2023-05-02 DIAGNOSIS — Z14.8 CARRIER OF HEMOCHROMATOSIS HFE GENE MUTATION: ICD-10-CM

## 2023-05-02 DIAGNOSIS — Z00.00 ROUTINE ADULT HEALTH MAINTENANCE: Primary | ICD-10-CM

## 2023-05-02 DIAGNOSIS — R73.03 PREDIABETES: ICD-10-CM

## 2023-05-02 DIAGNOSIS — E66.09 CLASS 1 OBESITY DUE TO EXCESS CALORIES WITHOUT SERIOUS COMORBIDITY WITH BODY MASS INDEX (BMI) OF 31.0 TO 31.9 IN ADULT: ICD-10-CM

## 2023-05-02 PROBLEM — E83.110 HEREDITARY HEMOCHROMATOSIS (HCC): Status: RESOLVED | Noted: 2023-05-02 | Resolved: 2023-05-02

## 2023-05-02 PROBLEM — E83.110 HEREDITARY HEMOCHROMATOSIS (HCC): Status: ACTIVE | Noted: 2023-05-02

## 2023-05-02 PROCEDURE — 99396 PREV VISIT EST AGE 40-64: CPT | Performed by: INTERNAL MEDICINE

## 2023-05-03 LAB
ALBUMIN SERPL-MCNC: 4.6 G/DL (ref 3.8–4.9)
ALBUMIN/GLOB SERPL: 1.8 {RATIO} (ref 1.2–2.2)
ALP SERPL-CCNC: 86 IU/L (ref 44–121)
ALT SERPL-CCNC: 18 IU/L (ref 0–32)
AST SERPL-CCNC: 21 IU/L (ref 0–40)
BASOPHILS # BLD AUTO: 0.1 X10E3/UL (ref 0–0.2)
BASOPHILS NFR BLD AUTO: 1 %
BILIRUB SERPL-MCNC: 0.3 MG/DL (ref 0–1.2)
BUN SERPL-MCNC: 15 MG/DL (ref 8–27)
BUN/CREAT SERPL: 16 (ref 12–28)
CALCIUM SERPL-MCNC: 9.5 MG/DL (ref 8.7–10.3)
CHLORIDE SERPL-SCNC: 103 MMOL/L (ref 96–106)
CHOLEST SERPL-MCNC: 258 MG/DL (ref 100–199)
CO2 SERPL-SCNC: 22 MMOL/L (ref 20–29)
CREAT SERPL-MCNC: 0.92 MG/DL (ref 0.57–1)
EGFRCR SERPLBLD CKD-EPI 2021: 71 ML/MIN/1.73
EOSINOPHIL # BLD AUTO: 0.1 X10E3/UL (ref 0–0.4)
EOSINOPHIL NFR BLD AUTO: 1 %
ERYTHROCYTE [DISTWIDTH] IN BLOOD BY AUTOMATED COUNT: 13.2 % (ref 11.7–15.4)
EST. AVERAGE GLUCOSE BLD GHB EST-MCNC: 120 MG/DL
FERRITIN SERPL-MCNC: 141 NG/ML (ref 15–150)
GLOBULIN SER CALC-MCNC: 2.6 G/DL (ref 1.5–4.5)
GLUCOSE SERPL-MCNC: 100 MG/DL (ref 70–99)
HBA1C MFR BLD: 5.8 % (ref 4.8–5.6)
HCT VFR BLD AUTO: 47.5 % (ref 34–46.6)
HDLC SERPL-MCNC: 66 MG/DL
HGB BLD-MCNC: 16.2 G/DL (ref 11.1–15.9)
IMM GRANULOCYTES # BLD AUTO: 0.1 X10E3/UL (ref 0–0.1)
IMM GRANULOCYTES NFR BLD AUTO: 1 %
LDLC SERPL CALC-MCNC: 152 MG/DL (ref 0–99)
LYMPHOCYTES # BLD AUTO: 3.2 X10E3/UL (ref 0.7–3.1)
LYMPHOCYTES NFR BLD AUTO: 43 %
MCH RBC QN AUTO: 31.8 PG (ref 26.6–33)
MCHC RBC AUTO-ENTMCNC: 34.1 G/DL (ref 31.5–35.7)
MCV RBC AUTO: 93 FL (ref 79–97)
MONOCYTES # BLD AUTO: 0.5 X10E3/UL (ref 0.1–0.9)
MONOCYTES NFR BLD AUTO: 7 %
NEUTROPHILS # BLD AUTO: 3.5 X10E3/UL (ref 1.4–7)
NEUTROPHILS NFR BLD AUTO: 47 %
PLATELET # BLD AUTO: 296 X10E3/UL (ref 150–450)
POTASSIUM SERPL-SCNC: 4.6 MMOL/L (ref 3.5–5.2)
PROT SERPL-MCNC: 7.2 G/DL (ref 6–8.5)
RBC # BLD AUTO: 5.1 X10E6/UL (ref 3.77–5.28)
SODIUM SERPL-SCNC: 138 MMOL/L (ref 134–144)
TRIGL SERPL-MCNC: 219 MG/DL (ref 0–149)
VLDLC SERPL CALC-MCNC: 40 MG/DL (ref 5–40)
WBC # BLD AUTO: 7.5 X10E3/UL (ref 3.4–10.8)

## 2023-07-19 ENCOUNTER — OFFICE VISIT (OUTPATIENT)
Age: 58
End: 2023-07-19
Payer: COMMERCIAL

## 2023-07-19 VITALS
TEMPERATURE: 97 F | OXYGEN SATURATION: 96 % | SYSTOLIC BLOOD PRESSURE: 129 MMHG | BODY MASS INDEX: 30.73 KG/M2 | HEART RATE: 75 BPM | HEIGHT: 64 IN | DIASTOLIC BLOOD PRESSURE: 89 MMHG | WEIGHT: 180 LBS

## 2023-07-19 DIAGNOSIS — Z14.8 CARRIER OF HEMOCHROMATOSIS HFE GENE MUTATION: ICD-10-CM

## 2023-07-19 DIAGNOSIS — R74.8 ELEVATED LIVER ENZYMES: ICD-10-CM

## 2023-07-19 PROBLEM — M77.01 MEDIAL EPICONDYLITIS OF RIGHT ELBOW: Status: RESOLVED | Noted: 2020-10-09 | Resolved: 2023-07-19

## 2023-07-19 PROBLEM — M26.609 TMJ DYSFUNCTION: Status: RESOLVED | Noted: 2017-08-09 | Resolved: 2023-07-19

## 2023-07-19 PROBLEM — Z79.899 LONG-TERM USE OF HIGH-RISK MEDICATION: Status: RESOLVED | Noted: 2021-04-28 | Resolved: 2023-07-19

## 2023-07-19 PROCEDURE — 99202 OFFICE O/P NEW SF 15 MIN: CPT | Performed by: NURSE PRACTITIONER

## 2023-07-19 PROCEDURE — 91200 LIVER ELASTOGRAPHY: CPT | Performed by: NURSE PRACTITIONER

## 2023-07-20 PROBLEM — Z14.8 CARRIER OF HEMOCHROMATOSIS HFE GENE MUTATION: Status: ACTIVE | Noted: 2023-07-20

## 2024-11-06 ENCOUNTER — HOSPITAL ENCOUNTER (OUTPATIENT)
Age: 59
Discharge: HOME | End: 2024-11-09
Payer: COMMERCIAL

## 2024-11-06 DIAGNOSIS — Z91.89 OTHER SPECIFIED PERSONAL RISK FACTORS, NOT ELSEWHERE CLASSIFIED: ICD-10-CM

## 2024-11-06 PROCEDURE — C8908 MRI W/O FOL W/CONT, BREAST,: HCPCS

## 2024-11-06 PROCEDURE — 6360000004 HC RX CONTRAST MEDICATION: Performed by: RADIOLOGY

## 2024-11-06 PROCEDURE — A9585 GADOBUTROL INJECTION: HCPCS | Performed by: RADIOLOGY

## 2024-11-06 RX ORDER — GADOBUTROL 604.72 MG/ML
8 INJECTION INTRAVENOUS
Status: COMPLETED | OUTPATIENT
Start: 2024-11-06 | End: 2024-11-06

## 2024-11-06 RX ADMIN — GADOBUTROL 8 ML: 604.72 INJECTION INTRAVENOUS at 15:30
